# Patient Record
Sex: MALE | Race: WHITE | Employment: OTHER | URBAN - NONMETROPOLITAN AREA
[De-identification: names, ages, dates, MRNs, and addresses within clinical notes are randomized per-mention and may not be internally consistent; named-entity substitution may affect disease eponyms.]

---

## 2017-03-27 RX ORDER — RAMIPRIL 2.5 MG/1
2.5 CAPSULE ORAL DAILY
Qty: 90 CAPSULE | Refills: 2 | Status: SHIPPED | OUTPATIENT
Start: 2017-03-27 | End: 2017-10-17 | Stop reason: SDUPTHER

## 2017-04-01 LAB
BUN BLDV-MCNC: 33 MG/DL
CALCIUM SERPL-MCNC: 9.5 MG/DL
CHLORIDE BLD-SCNC: 100 MMOL/L
CO2: 20 MMOL/L
CREAT SERPL-MCNC: 1.56 MG/DL
GFR CALCULATED: 45
GLUCOSE BLD-MCNC: 204 MG/DL
HCT VFR BLD CALC: 35.3 % (ref 41–53)
HEMOGLOBIN: 11.4 G/DL (ref 13.5–17.5)
PHOSPHORUS: 3.4 MG/DL
POTASSIUM SERPL-SCNC: 5.2 MMOL/L
PTH INTACT: 19
SODIUM BLD-SCNC: 138 MMOL/L
VITAMIN D 25-HYDROXY: 22.7
VITAMIN D2, 25 HYDROXY: NORMAL
VITAMIN D3,25 HYDROXY: NORMAL

## 2017-04-03 RX ORDER — RAMIPRIL 2.5 MG/1
CAPSULE ORAL
Qty: 90 CAPSULE | Refills: 2 | Status: SHIPPED | OUTPATIENT
Start: 2017-04-03 | End: 2017-10-12 | Stop reason: SDUPTHER

## 2017-04-13 ENCOUNTER — OFFICE VISIT (OUTPATIENT)
Dept: NEPHROLOGY | Age: 67
End: 2017-04-13

## 2017-04-13 VITALS
OXYGEN SATURATION: 96 % | DIASTOLIC BLOOD PRESSURE: 74 MMHG | BODY MASS INDEX: 34.72 KG/M2 | SYSTOLIC BLOOD PRESSURE: 120 MMHG | WEIGHT: 256 LBS | HEART RATE: 72 BPM

## 2017-04-13 DIAGNOSIS — D63.8 ANEMIA, CHRONIC DISEASE: ICD-10-CM

## 2017-04-13 DIAGNOSIS — N18.3 TYPE 2 DIABETES MELLITUS WITH STAGE 3 CHRONIC KIDNEY DISEASE, UNSPECIFIED LONG TERM INSULIN USE STATUS: ICD-10-CM

## 2017-04-13 DIAGNOSIS — I10 ESSENTIAL HYPERTENSION: ICD-10-CM

## 2017-04-13 DIAGNOSIS — E11.22 TYPE 2 DIABETES MELLITUS WITH STAGE 3 CHRONIC KIDNEY DISEASE, UNSPECIFIED LONG TERM INSULIN USE STATUS: ICD-10-CM

## 2017-04-13 DIAGNOSIS — N18.30 CKD (CHRONIC KIDNEY DISEASE) STAGE 3, GFR 30-59 ML/MIN (HCC): Primary | ICD-10-CM

## 2017-04-13 PROCEDURE — G8419 CALC BMI OUT NRM PARAM NOF/U: HCPCS | Performed by: INTERNAL MEDICINE

## 2017-04-13 PROCEDURE — 99213 OFFICE O/P EST LOW 20 MIN: CPT | Performed by: INTERNAL MEDICINE

## 2017-04-13 PROCEDURE — 3046F HEMOGLOBIN A1C LEVEL >9.0%: CPT | Performed by: INTERNAL MEDICINE

## 2017-04-13 PROCEDURE — 1036F TOBACCO NON-USER: CPT | Performed by: INTERNAL MEDICINE

## 2017-04-13 PROCEDURE — 3017F COLORECTAL CA SCREEN DOC REV: CPT | Performed by: INTERNAL MEDICINE

## 2017-04-13 PROCEDURE — G8598 ASA/ANTIPLAT THER USED: HCPCS | Performed by: INTERNAL MEDICINE

## 2017-04-13 PROCEDURE — G8427 DOCREV CUR MEDS BY ELIG CLIN: HCPCS | Performed by: INTERNAL MEDICINE

## 2017-04-13 PROCEDURE — 1123F ACP DISCUSS/DSCN MKR DOCD: CPT | Performed by: INTERNAL MEDICINE

## 2017-04-13 PROCEDURE — 4040F PNEUMOC VAC/ADMIN/RCVD: CPT | Performed by: INTERNAL MEDICINE

## 2017-04-13 RX ORDER — METOPROLOL SUCCINATE 25 MG/1
25 TABLET, EXTENDED RELEASE ORAL DAILY
COMMUNITY

## 2017-09-11 ENCOUNTER — TELEPHONE (OUTPATIENT)
Dept: SURGERY | Age: 67
End: 2017-09-11

## 2017-09-20 ENCOUNTER — OFFICE VISIT (OUTPATIENT)
Dept: SURGERY | Age: 67
End: 2017-09-20
Payer: COMMERCIAL

## 2017-09-20 VITALS
HEIGHT: 72 IN | BODY MASS INDEX: 34.23 KG/M2 | OXYGEN SATURATION: 94 % | RESPIRATION RATE: 18 BRPM | WEIGHT: 252.7 LBS | HEART RATE: 74 BPM | TEMPERATURE: 97 F | SYSTOLIC BLOOD PRESSURE: 118 MMHG | DIASTOLIC BLOOD PRESSURE: 62 MMHG

## 2017-09-20 DIAGNOSIS — I10 ESSENTIAL HYPERTENSION: ICD-10-CM

## 2017-09-20 DIAGNOSIS — Z01.818 PREOP EXAMINATION: ICD-10-CM

## 2017-09-20 DIAGNOSIS — Z12.11 COLON CANCER SCREENING: Primary | ICD-10-CM

## 2017-09-20 DIAGNOSIS — K43.2 RECURRENT INCISIONAL HERNIA: ICD-10-CM

## 2017-09-20 DIAGNOSIS — Z01.818 PRE-OP TESTING: ICD-10-CM

## 2017-09-20 DIAGNOSIS — E66.9 OBESITY (BMI 30-39.9): ICD-10-CM

## 2017-09-20 DIAGNOSIS — K43.2 INCISIONAL HERNIA, WITHOUT OBSTRUCTION OR GANGRENE: Primary | ICD-10-CM

## 2017-09-20 PROCEDURE — G8417 CALC BMI ABV UP PARAM F/U: HCPCS | Performed by: SURGERY

## 2017-09-20 PROCEDURE — 1123F ACP DISCUSS/DSCN MKR DOCD: CPT | Performed by: SURGERY

## 2017-09-20 PROCEDURE — 3017F COLORECTAL CA SCREEN DOC REV: CPT | Performed by: SURGERY

## 2017-09-20 PROCEDURE — 99214 OFFICE O/P EST MOD 30 MIN: CPT | Performed by: SURGERY

## 2017-09-20 PROCEDURE — 1036F TOBACCO NON-USER: CPT | Performed by: SURGERY

## 2017-09-20 PROCEDURE — G8427 DOCREV CUR MEDS BY ELIG CLIN: HCPCS | Performed by: SURGERY

## 2017-09-20 PROCEDURE — G8598 ASA/ANTIPLAT THER USED: HCPCS | Performed by: SURGERY

## 2017-09-20 PROCEDURE — 4040F PNEUMOC VAC/ADMIN/RCVD: CPT | Performed by: SURGERY

## 2017-09-20 ASSESSMENT — ENCOUNTER SYMPTOMS
COLOR CHANGE: 0
SHORTNESS OF BREATH: 0
EYE REDNESS: 0
DIARRHEA: 0
EYE PAIN: 0
EYE ITCHING: 0
RHINORRHEA: 0
FACIAL SWELLING: 0
STRIDOR: 0
CONSTIPATION: 0
VOMITING: 0
ABDOMINAL PAIN: 1
RECTAL PAIN: 0
COUGH: 0
BLOOD IN STOOL: 0
CHOKING: 0
SINUS PRESSURE: 0
ANAL BLEEDING: 1
VOICE CHANGE: 0
WHEEZING: 0
EYE DISCHARGE: 0
PHOTOPHOBIA: 0
APNEA: 0
CHEST TIGHTNESS: 0
TROUBLE SWALLOWING: 0
SORE THROAT: 0
BACK PAIN: 0
ABDOMINAL DISTENTION: 0
NAUSEA: 0

## 2017-09-22 ENCOUNTER — TELEPHONE (OUTPATIENT)
Dept: SURGERY | Age: 67
End: 2017-09-22

## 2017-09-22 DIAGNOSIS — K43.2 INCISIONAL HERNIA, WITHOUT OBSTRUCTION OR GANGRENE: Primary | ICD-10-CM

## 2017-09-26 ENCOUNTER — TELEPHONE (OUTPATIENT)
Dept: SURGERY | Age: 67
End: 2017-09-26

## 2017-09-27 LAB
BUN BLDV-MCNC: 35 MG/DL
CALCIUM SERPL-MCNC: 9.7 MG/DL
CHLORIDE BLD-SCNC: 101 MMOL/L
CO2: 18 MMOL/L
CREAT SERPL-MCNC: 1.95 MG/DL
FERRITIN: 787 NG/ML (ref 18–300)
GFR CALCULATED: 34
GLUCOSE BLD-MCNC: 160 MG/DL
HCT VFR BLD CALC: 34.7 % (ref 41–53)
HEMOGLOBIN: 11.6 G/DL (ref 13.5–17.5)
IRON SATURATION: 27
IRON: 102
PHOSPHORUS: 3.3 MG/DL
POTASSIUM SERPL-SCNC: 5.1 MMOL/L
PTH INTACT: 17
SODIUM BLD-SCNC: 139 MMOL/L
VITAMIN D 25-HYDROXY: 18.8
VITAMIN D2, 25 HYDROXY: NORMAL
VITAMIN D3,25 HYDROXY: NORMAL

## 2017-09-28 ENCOUNTER — TELEPHONE (OUTPATIENT)
Dept: SURGERY | Age: 67
End: 2017-09-28

## 2017-09-28 DIAGNOSIS — K43.2 INCISIONAL HERNIA, WITHOUT OBSTRUCTION OR GANGRENE: Primary | ICD-10-CM

## 2017-10-05 ENCOUNTER — HOSPITAL ENCOUNTER (OUTPATIENT)
Age: 67
Setting detail: OUTPATIENT SURGERY
Discharge: HOME | End: 2017-10-05
Attending: SURGERY | Admitting: SURGERY
Payer: COMMERCIAL

## 2017-10-05 VITALS
BODY MASS INDEX: 33.72 KG/M2 | OXYGEN SATURATION: 96 % | HEIGHT: 72 IN | RESPIRATION RATE: 16 BRPM | DIASTOLIC BLOOD PRESSURE: 69 MMHG | WEIGHT: 249 LBS | HEART RATE: 71 BPM | SYSTOLIC BLOOD PRESSURE: 136 MMHG | TEMPERATURE: 97.2 F

## 2017-10-05 PROCEDURE — 3609009500 HC COLONOSCOPY DIAGNOSTIC OR SCREENING: Performed by: SURGERY

## 2017-10-05 PROCEDURE — 99152 MOD SED SAME PHYS/QHP 5/>YRS: CPT | Performed by: SURGERY

## 2017-10-05 PROCEDURE — 2580000003 HC RX 258: Performed by: SURGERY

## 2017-10-05 PROCEDURE — 6360000002 HC RX W HCPCS: Performed by: SURGERY

## 2017-10-05 PROCEDURE — 7100000000 HC PACU RECOVERY - FIRST 15 MIN: Performed by: SURGERY

## 2017-10-05 PROCEDURE — 99153 MOD SED SAME PHYS/QHP EA: CPT | Performed by: SURGERY

## 2017-10-05 PROCEDURE — 3609027000 HC COLONOSCOPY: Performed by: SURGERY

## 2017-10-05 PROCEDURE — 7100000001 HC PACU RECOVERY - ADDTL 15 MIN: Performed by: SURGERY

## 2017-10-05 PROCEDURE — 3609009600 HC COLONOSCOPY STOMA DX INCLUDING COLLJ SPEC SPX: Performed by: SURGERY

## 2017-10-05 RX ORDER — MIDAZOLAM HYDROCHLORIDE 1 MG/ML
INJECTION INTRAMUSCULAR; INTRAVENOUS PRN
Status: DISCONTINUED | OUTPATIENT
Start: 2017-10-05 | End: 2017-10-05 | Stop reason: HOSPADM

## 2017-10-05 RX ORDER — FENTANYL CITRATE 50 UG/ML
INJECTION, SOLUTION INTRAMUSCULAR; INTRAVENOUS PRN
Status: DISCONTINUED | OUTPATIENT
Start: 2017-10-05 | End: 2017-10-05 | Stop reason: HOSPADM

## 2017-10-05 RX ORDER — SODIUM CHLORIDE 450 MG/100ML
INJECTION, SOLUTION INTRAVENOUS CONTINUOUS
Status: DISCONTINUED | OUTPATIENT
Start: 2017-10-05 | End: 2017-10-05 | Stop reason: HOSPADM

## 2017-10-05 RX ADMIN — SODIUM CHLORIDE: 4.5 INJECTION, SOLUTION INTRAVENOUS at 08:07

## 2017-10-05 ASSESSMENT — PAIN SCALES - GENERAL
PAINLEVEL_OUTOF10: 0
PAINLEVEL_OUTOF10: 0

## 2017-10-05 ASSESSMENT — PAIN - FUNCTIONAL ASSESSMENT: PAIN_FUNCTIONAL_ASSESSMENT: 0-10

## 2017-10-05 NOTE — IP AVS SNAPSHOT
Take 150 mg by mouth daily                                         STARLIX 120 MG tablet   Generic drug:  nateglinide   Take 120 mg by mouth 3 times daily (before meals). TRICOR 145 MG tablet   Generic drug:  fenofibrate   Take 145 mg by mouth daily                                         Vitamin D3 2000 units Caps   Patient to take 5000 IU daily                                                 Allergies as of 10/5/2017        Reactions    Allegra [Fexofenadine Hydrochloride]     dizziness    Flagyl [Metronidazole] Nausea And Vomiting      Immunizations as of 10/5/2017     No immunizations on file. Last Vitals          Most Recent Value    Temp  97.2 °F (36.2 °C)    Pulse  71    Resp  16    BP  136/69         After Visit Summary    This summary was created for you. Thank you for entrusting your care to us. The following information includes details about your hospital/visit stay along with steps you should take to help with your recovery once you leave the hospital.  In this packet, you will find information about the topics listed below:    · Instructions about your medications including a list of your home medications  · A summary of your hospital visit  · Follow-up appointments once you have left the hospital  · Your care plan at home      You may receive a survey regarding the care you received during your stay. Your input is valuable to us. We encourage you to complete and return your survey in the envelope provided. We hope you will choose us in the future for your healthcare needs.           Patient Information     Patient Name PEG Norris Led 1950      Care Provided at:     Name Address Phone       [address] [phone]            Your Visit    Here you will find information about your visit, including the reason for Cholesterol Screening 7/19/1958    Urine Check For Kidney Problems 7/19/1966    Tetanus Combination Vaccine (1 - Tdap) 7/19/1967    Colonoscopy 7/19/1998    Zoster Vaccine 7/19/2008    Pneumococcal Vaccines (two) for all adults aged 72 and over (1 of 2 - PCV13) 1/16/2014    Yearly Flu Vaccine (1) 9/1/2017                 Care Plan Once You Return Home    This section includes instructions you will need to follow once you leave the hospital.  Your care team will discuss these with you, so you and those caring for you know how to best care for your health needs at home. This section may also include educational information about certain health topics that may be of help to you. [employer] Signup     Our records indicate that you have an active Woppa account. You can view your After Visit Summary by going to [email or web address] and logging in with your Woppa username and password. If you don't have a Woppa username and password but a parent or guardian has access to your record, the parent or guardian should login with their own Woppa username and password and access your record to view the After Visit Summary. Additional Information  If you have questions, please contact the physician practice where you receive care. Remember, Woppa is NOT to be used for urgent needs. For medical emergencies, dial 911. For questions regarding your Woppa account call [phone]. If you have a clinical question, please call your doctor's office. View your information online  ? Review your current list of  medications, immunization, and allergies. ? Review your future test results online . ?  Review your discharge instructions provided by your caregivers at discharge    Certain functionality such as prescription refills, scheduling appointments or sending messages to your provider are not activated if your provider does not use Definition 6 in his/her office Watch closely for changes in your health, and be sure to contact your doctor if you have any problems. Where can you learn more? Go to [email or web address] and sign in to your Total-trax account. Enter E264 in the NutraMed box to learn more about \"Colonoscopy: What to Expect at Home. \"     If you do not have an account, please click on the \"Sign Up Now\" link. Current as of: August 9, 2016  Content Version: 11.3  © 5134-4969 [employer]. Care instructions adapted under license by [facility] ([facility]). If you have questions about a medical condition or this instruction, always ask your healthcare professional. [address] any warranty or liability for your use of this information.

## 2017-10-05 NOTE — IP AVS SNAPSHOT
Patient Information     Patient Name PEG Jordan 1950         This is your updated medication list to keep with you all times      TAKE these medications     aspirin 325 MG tablet       AVANDIA 4 MG tablet   Generic drug:  rosiglitazone       CVS VITAMIN B12 1000 MCG tablet   Generic drug:  cyanocobalamin   TAKE 1 TABLET BY MOUTH EVERY DAY       ferrous sulfate 325 (65 Fe) MG tablet       fish oil 1000 MG Caps       magnesium 250 MG Tabs tablet   Commonly known as:  MAGNESIUM-OXIDE       metoprolol succinate 25 MG extended release tablet   Commonly known as:  TOPROL XL       PX MENS MULTIVITAMINS PO       * ramipril 2.5 MG capsule   Commonly known as:  ALTACE   Take 1 capsule by mouth daily       * ramipril 2.5 MG capsule   Commonly known as:  ALTACE   TAKE ONE CAPSULE BY MOUTH EVERY DAY       RANITIDINE 75 PO       STARLIX 120 MG tablet   Generic drug:  nateglinide       TRICOR 145 MG tablet   Generic drug:  fenofibrate       Vitamin D3 2000 units Caps   Patient to take 5000 IU daily       * Notice: This list has 2 medication(s) that are the same as other medications prescribed for you. Read the directions carefully, and ask your doctor or other care provider to review them with you.

## 2017-10-05 NOTE — BRIEF OP NOTE
Brief Postoperative Note  ______________________________________________________________    Patient: Audrey Naqvi  YOB: 1950  MRN: [id number]  Date of Procedure: 10/5/2017    Pre-Op Diagnosis: screening colonoscopy    Post-Op Diagnosis: Residual Diverticulosis   Normal Rectal Stump to 20 cm       Procedure(s):  COLONOSCOPY THROUGH OSTOMY & RECTAL STUMP    Anesthesia: IV Sedation    Surgeon(s):  Lanita Snellen, MD    Staff:  Scrub Person First: Ary Helm     Estimated Blood Loss: * No values recorded between 10/5/2017  8:05 AM and 10/5/2017  0:64 AM *    Complications: None    Specimens:   * No specimens in log *    Implants:  * No implants in log *      Drains:   Closed/Suction Drain Right Abdomen Bulb 19 Maltese (Active)       Closed/Suction Drain Left Abdomen 19 Maltese (Active)       Colostomy RLQ (Active)           Findings: see op note    Lanita Snellen, MD  Date: 10/5/2017  Time: 8:50 AM

## 2017-10-06 NOTE — H&P
[address] [place] [address]                      PREOPERATIVE HISTORY AND PHYSICAL    PATIENT NAME: Kandis Hankins                   :             1950  MED REC NO:   [id number]                            ROOM:  ACCOUNT NO:   [de-identified]                            ADMISSION DATE:  10/05/2017  PROVIDER:     Hang Onofre. Roxy Pleitez MD    CHIEF COMPLAINT:  Ventral hernia, history of colostomy formation. HISTORY OF PRESENT ILLNESS:  The patient is a 39-year-old white male,  formerly patient of mine, who in 2011 had a sigmoid colon resection  secondary to diverticular disease. The patient had a delayed leak,  required a re-exploration with diversion performed per Dr. Eri Mckeon. In , he did underwent a ventral hernia repair with a parastomal  hernia repair via an abdominal wall bilateral myocutaneous advancement  flaps. The patient still has the ostomy. He also has noted some  blood in the ostomy bag and at the rectal stump and has recurrent  hernia. Prior to hernia repair by Dr. Eri Mckeon, he is being admitted  at this time for colonoscopy via the colostomy and of the rectal  stump. PAST MEDICAL HISTORY:  Positive for coronary artery disease,  diverticulitis, hyperlipidemia, hypertension, renal insufficiency,  type 2 diabetes. PAST SURGICAL HISTORY:  Includes appendectomy along with the sigmoid  colon resection as mentioned above with repeat exploration with  formation of colostomy, abdominal hernia repair. The patient has had  remote history of knee surgery. The patient has also had  cardiovascular bypass grafts x3. He has also had rotator cuff and  remote tonsillectomy. ALLERGIES:  FLAGYL and ALLEGRA. FAMILY HISTORY:  Noncontributory. SOCIAL HISTORY:  The patient is . He is a nondrinker,  nonsmoker. MEDICATIONS:  Documented in the chart.     PHYSICAL EXAMINATION:  GENERAL:  The patient is a 53-year-old white male, appears his age. HEAD, EARS, EYES, NOSE, and THROAT:  Show no scleral icterus. CARDIOVASCULAR:  S1, S2.  LUNGS:  The respirations are clear. ABDOMEN:  Soft. Bowel sounds are positive. Examining the abdomen  further does reveal a recurrent hernia in the midline. EXTREMITIES:  Within normal limits. ASSESSMENT/PLAN:  Status post colostomy with some blood in the  colostomy and blood from the rectal stump. The patient here for  colonoscopy via the colostomy and also the rectal stump. FLAVIO GEORGE [facility], MD    D: 10/05/2017 15:48:21       T: 10/05/2017 16:02:37     [email or web address]  Job#: [id number]     Doc#: [id number]

## 2017-10-06 NOTE — OP NOTE
were  given for IV sedation and throughout the procedure another 3 mg was  given. The Olympus endoscope was inserted through the ostomy, passed  up through the rest of the descending colon across the transverse  colon and then down into the cecum. Picture of the cecum was taken. We could also see the appendiceal orifice and then the scope was  withdrawn.  _____ to the ostomy and I reinserted the scope all the way  back again to view the area . At this point in time, the scope was  withdrawn again in a retrograde fashion and we reviewed and we will  continue in this manner of withdrawal, reinsertion to view the area. There was no polyps. There was some residual diverticulosis. The  scope was withdrawn back to the ostomy. The patient was then placed  in the left lateral decubitus position and the rectal stump was  cannulated. There was noted to be some mucus, but I was able to pass  the scope up to about 20 cm. I could not see to find any movement  after this. The scope was continued to be withdrawn. The patient  tolerated the procedure well. The anal opening looked normal.        FLAVIO GEORGE [facility], MD    D: 10/05/2017 13:08:34       T: 10/05/2017 13:14:22     [email or web address]  Job#: [id number]     Doc#: [id number]

## 2017-10-09 ENCOUNTER — HOSPITAL ENCOUNTER (OUTPATIENT)
Dept: CT IMAGING | Age: 67
Discharge: HOME | End: 2017-10-09
Payer: COMMERCIAL

## 2017-10-09 DIAGNOSIS — K43.2 INCISIONAL HERNIA, WITHOUT OBSTRUCTION OR GANGRENE: ICD-10-CM

## 2017-10-09 LAB — POC CREATININE WHOLE BLOOD: 1.7 MG/DL (ref 0.5–1.2)

## 2017-10-09 PROCEDURE — 74176 CT ABD & PELVIS W/O CONTRAST: CPT

## 2017-10-09 PROCEDURE — 82565 ASSAY OF CREATININE: CPT

## 2017-10-12 ENCOUNTER — OFFICE VISIT (OUTPATIENT)
Dept: NEPHROLOGY | Age: 67
End: 2017-10-12
Payer: COMMERCIAL

## 2017-10-12 VITALS
WEIGHT: 248 LBS | SYSTOLIC BLOOD PRESSURE: 110 MMHG | HEART RATE: 80 BPM | RESPIRATION RATE: 16 BRPM | DIASTOLIC BLOOD PRESSURE: 70 MMHG | BODY MASS INDEX: 33.63 KG/M2

## 2017-10-12 DIAGNOSIS — D63.8 ANEMIA, CHRONIC DISEASE: ICD-10-CM

## 2017-10-12 DIAGNOSIS — N18.30 CKD (CHRONIC KIDNEY DISEASE) STAGE 3, GFR 30-59 ML/MIN (HCC): Primary | ICD-10-CM

## 2017-10-12 DIAGNOSIS — E55.9 VITAMIN D DEFICIENCY: ICD-10-CM

## 2017-10-12 DIAGNOSIS — N18.3 TYPE 2 DIABETES MELLITUS WITH STAGE 3 CHRONIC KIDNEY DISEASE, UNSPECIFIED LONG TERM INSULIN USE STATUS: ICD-10-CM

## 2017-10-12 DIAGNOSIS — E11.22 TYPE 2 DIABETES MELLITUS WITH STAGE 3 CHRONIC KIDNEY DISEASE, UNSPECIFIED LONG TERM INSULIN USE STATUS: ICD-10-CM

## 2017-10-12 DIAGNOSIS — I10 ESSENTIAL HYPERTENSION: ICD-10-CM

## 2017-10-12 PROCEDURE — G8427 DOCREV CUR MEDS BY ELIG CLIN: HCPCS | Performed by: INTERNAL MEDICINE

## 2017-10-12 PROCEDURE — 1123F ACP DISCUSS/DSCN MKR DOCD: CPT | Performed by: INTERNAL MEDICINE

## 2017-10-12 PROCEDURE — 99214 OFFICE O/P EST MOD 30 MIN: CPT | Performed by: INTERNAL MEDICINE

## 2017-10-12 PROCEDURE — 3046F HEMOGLOBIN A1C LEVEL >9.0%: CPT | Performed by: INTERNAL MEDICINE

## 2017-10-12 PROCEDURE — G8484 FLU IMMUNIZE NO ADMIN: HCPCS | Performed by: INTERNAL MEDICINE

## 2017-10-12 PROCEDURE — 4040F PNEUMOC VAC/ADMIN/RCVD: CPT | Performed by: INTERNAL MEDICINE

## 2017-10-12 PROCEDURE — 1036F TOBACCO NON-USER: CPT | Performed by: INTERNAL MEDICINE

## 2017-10-12 PROCEDURE — 3017F COLORECTAL CA SCREEN DOC REV: CPT | Performed by: INTERNAL MEDICINE

## 2017-10-12 PROCEDURE — G8417 CALC BMI ABV UP PARAM F/U: HCPCS | Performed by: INTERNAL MEDICINE

## 2017-10-12 PROCEDURE — G8598 ASA/ANTIPLAT THER USED: HCPCS | Performed by: INTERNAL MEDICINE

## 2017-10-12 RX ORDER — ISOSORBIDE MONONITRATE 30 MG/1
30 TABLET, EXTENDED RELEASE ORAL DAILY
COMMUNITY
End: 2020-07-16

## 2017-10-12 NOTE — PROGRESS NOTES
ventral hernia by Dr. Chai Rockwell on 16 October 2017. Vitamin D level was low at 22. We asked him to take  vitamin D3 over-the-counter 2000 international units 1 capsule a day. Vitamin D level actually dropped to 18 now. ROS:Constitutional: negative  Eyes: negative  Ears, nose, mouth, throat, and face: negative  Respiratory: negative  Cardiovascular: negative  Gastrointestinal: negative  Genitourinary:negative  Integument/breast: negative  Hematologic/lymphatic: negative  Musculoskeletal:positive for arthralgias and stiff joints  Neurological: negative  Behavioral/Psych: negative  Endocrine: negative  Allergic/Immunologic: negative  Medications:     Current Outpatient Prescriptions   Medication Sig Dispense Refill    isosorbide mononitrate (IMDUR) 30 MG extended release tablet Take 30 mg by mouth daily      metoprolol succinate (TOPROL XL) 25 MG extended release tablet Take 25 mg by mouth daily      ramipril (ALTACE) 2.5 MG capsule Take 1 capsule by mouth daily 90 capsule 2    ferrous sulfate 325 (65 FE) MG tablet Take 325 mg by mouth daily (with breakfast)      Cholecalciferol (VITAMIN D3) 2000 UNITS CAPS Patient to take 5000 IU daily 30 capsule 0    CVS VITAMIN B12 1000 MCG tablet TAKE 1 TABLET BY MOUTH EVERY DAY 90 tablet 3    magnesium (MAGNESIUM-OXIDE) 250 MG TABS tablet Take 250 mg by mouth 2 times daily       rosiglitazone (AVANDIA) 4 MG tablet Take 4 mg by mouth 2 times daily.  Multiple Vitamins-Minerals (PX MENS MULTIVITAMINS PO) Take  by mouth. otc-with iron       fenofibrate (TRICOR) 145 MG tablet Take 145 mg by mouth daily       aspirin 325 MG tablet Take 325 mg by mouth daily. Stop 1 week preop      Ranitidine HCl (RANITIDINE 75 PO) Take 150 mg by mouth daily       Omega-3 Fatty Acids (FISH OIL) 1000 MG CAPS Take 1,000 mg by mouth 2 times daily.  nateglinide (STARLIX) 120 MG tablet Take 120 mg by mouth 3 times daily (before meals).          No current facility-administered

## 2017-10-12 NOTE — PATIENT INSTRUCTIONS
Please increase  vitamin D3 over-the-counter from 2000 international units 1 capsule a day to twice a day.

## 2017-10-17 RX ORDER — RAMIPRIL 2.5 MG/1
2.5 CAPSULE ORAL DAILY
Qty: 90 CAPSULE | Refills: 3 | Status: SHIPPED | OUTPATIENT
Start: 2017-10-17 | End: 2018-12-03 | Stop reason: SDUPTHER

## 2017-10-25 NOTE — H&P
Subjective:      Patient ID: Lisa Lincoln is a 69 y.o. male. Chief Complaint   Patient presents with    Surgical Consult       est pt-parastomal hernia-rectal bleeding-      HPI  Diann Baker is a 49-year-old male who presents for evaluation of a abdominal wall bulge. Back in September 2011 he underwent open sigmoid resection by Dr. Lorelei Neumann secondary to diverticular disease. Patient had a delayed leak. Required exploration with diversion. In October 2012 he underwent ventral hernia repair and parastomal hernia repair with bilateral abdominal wall myocutaneous advancement flaps using biological mesh. He states he has done well until the last few months. He states he has developed a bulge through the previous midline incision above his umbilicus but below the xiphoid. Bulge is starting to increase in size. Becoming more difficult to reduce. Mildly tender. Worse with increased activity, lifting and bending over. Improved with rest and in the morning. Worsens throughout the day when he is very active. He states recently he noticed some blood on his stools in the ostomy bag. Also noticed some blood at the rectal stump. He states over the last week he has not noticed anything else. Denies having any colonoscopy or endoscopy since original surgery. Denies any generalized abdominal pain. No radiation of the discomfort from the hernia. No sharp severe pains. No chest pain or shortness of breath. No lightheadedness or dizziness. No unexplained weight loss. No fever, chills or sweats. No new urinary complaints. He states overall he is doing well with the ostomy. He has a known large bladder diverticulum and states he is not wanting the ostomy reversed at this time.     Review of Systems   Constitutional: Negative for activity change, appetite change, chills, diaphoresis, fatigue, fever and unexpected weight change.    HENT: Negative for congestion, dental problem, drooling, ear discharge, ear pain, facial swelling, hearing loss, mouth sores, nosebleeds, postnasal drip, rhinorrhea, sinus pressure, sneezing, sore throat, tinnitus, trouble swallowing and voice change. Eyes: Negative for photophobia, pain, discharge, redness, itching and visual disturbance. Respiratory: Negative for apnea, cough, choking, chest tightness, shortness of breath, wheezing and stridor. Cardiovascular: Negative for chest pain, palpitations and leg swelling. Gastrointestinal: Positive for abdominal pain and anal bleeding. Negative for abdominal distention, blood in stool, constipation, diarrhea, nausea, rectal pain and vomiting. Patient has colostomy   Endocrine: Negative for cold intolerance, heat intolerance, polydipsia, polyphagia and polyuria. Genitourinary: Negative for decreased urine volume, difficulty urinating, discharge, dysuria, enuresis, flank pain, frequency, genital sores, hematuria, penile pain, penile swelling, scrotal swelling, testicular pain and urgency. Musculoskeletal: Negative for arthralgias, back pain, gait problem, joint swelling, myalgias, neck pain and neck stiffness. Skin: Negative for color change, pallor, rash and wound. Allergic/Immunologic: Negative for environmental allergies, food allergies and immunocompromised state. Neurological: Negative for dizziness, tremors, seizures, syncope, facial asymmetry, speech difficulty, weakness, light-headedness, numbness and headaches. Hematological: Negative for adenopathy. Does not bruise/bleed easily. Psychiatric/Behavioral: Negative for agitation, behavioral problems, confusion, decreased concentration, dysphoric mood, hallucinations, self-injury, sleep disturbance and suicidal ideas.  The patient is not nervous/anxious and is not hyperactive.       Past Medical History        Past Medical History:   Diagnosis Date    Blood transfusion      CAD (coronary artery disease)       sees Dr Anastasia Mane Diverticulitis      Conjunctivae and EOM are normal. Pupils are equal, round, and reactive to light. Right eye exhibits no discharge. Left eye exhibits no discharge. No scleral icterus. Neck: Trachea normal, normal range of motion, full passive range of motion without pain and phonation normal. Neck supple. No JVD present. Cardiovascular: Normal rate, regular rhythm, S1 normal, S2 normal and normal heart sounds. Exam reveals no gallop and no friction rub. No murmur heard. Pulmonary/Chest: Effort normal and breath sounds normal. No respiratory distress. He has no decreased breath sounds. He has no wheezes. He has no rhonchi. He has no rales. He exhibits no tenderness and no deformity. Abdominal: Soft. Bowel sounds are normal. He exhibits no distension, no abdominal bruit and no mass. There is no hepatosplenomegaly. There is tenderness (mild) in the epigastric area. There is no rigidity, no rebound and no guarding. A hernia is present. Hernia confirmed positive in the ventral area (recurrent incisional). Musculoskeletal: Normal range of motion. He exhibits no edema or tenderness. Neurological: He is alert and oriented to person, place, and time. He has normal strength. No cranial nerve deficit or sensory deficit. Skin: Skin is warm and dry. No rash noted. He is not diaphoretic. No erythema. No pallor. Psychiatric: He has a normal mood and affect.  His speech is normal and behavior is normal. Judgment and thought content normal. Cognition and memory are normal.   Vitals reviewed.           Lab Results   Component Value Date      04/01/2017     K 5.2 04/01/2017      04/01/2017     CO2 20 04/01/2017            Lab Results   Component Value Date     CREATININE 1.56 04/01/2017            Lab Results   Component Value Date     WBC 8.0 10/31/2012     HGB 11.4 (A) 04/01/2017     HCT 35.3 (A) 04/01/2017     MCV 90.7 10/31/2012      10/31/2012      No results found for: ALT, AST, GGT, ALKPHOS, BILITOT  No results found for: LIPASE      Imaging - none     Patient Active Problem List   Diagnosis    Diverticula of colon    Wound, open, abdominal wall, anterior    Incisional hernia    CKD (chronic kidney disease) stage 3, GFR 30-59 ml/min    Vitamin D deficiency    HTN (hypertension)    DM type 2 (diabetes mellitus, type 2) ([facility] [address]    CAD (coronary artery disease)    Iron deficiency anemia    Hypertriglyceridemia    Hyperkalemia    Type 2 diabetes mellitus with stage 3 chronic kidney disease, without long-term current use of insulin (HCC)    Anemia, chronic disease      Assessment:      1. Recurrent incisional hernia  2. Rectal stump bleed  3. Blood on stool/ostomy  4. Obesity (BMI 34)  5. Possible small parastomal hernia                          Plan:      1. Schedule Rico JOHNSON for Repair recurrent incisional hernia with mesh. 2. He will undergo pre-operative clearance per anesthesia guidelines with risk factors listed under the past medical history diagnosis & problem list.  3. The risks, benefits and alternatives were discussed with Shahab JOHNSON including non-operative management. The pros and cons of robotic, laparoscopic and open techniques were discussed. The pros and cons of mesh insertion were discussed. All questions answered. He understands and wishes to proceed with surgical intervention. 4. Restrictions discussed with Nicol Moya and he expresses understanding. 5. He is advised to call back directly if there are further questions/concerns, or if his symptoms worsen prior to surgery. 6.  Obtain colonoscopy and anoscopy prior to surgical intervention. Set up with Dr. Kacy Frye known to patient. 7.  Obtain CT abdomen and pelvis prior to surgical intervention to better delineate anatomy secondary to high probability of hostile abdomen.

## 2017-10-26 ENCOUNTER — HOSPITAL ENCOUNTER (OUTPATIENT)
Age: 67
Setting detail: OUTPATIENT SURGERY
Discharge: HOME | End: 2017-10-26
Attending: SURGERY | Admitting: SURGERY
Payer: COMMERCIAL

## 2017-10-26 ENCOUNTER — ANESTHESIA EVENT (OUTPATIENT)
Dept: OPERATING ROOM | Age: 67
End: 2017-10-26
Payer: COMMERCIAL

## 2017-10-26 ENCOUNTER — ANESTHESIA (OUTPATIENT)
Dept: OPERATING ROOM | Age: 67
End: 2017-10-26
Payer: COMMERCIAL

## 2017-10-26 VITALS — DIASTOLIC BLOOD PRESSURE: 74 MMHG | SYSTOLIC BLOOD PRESSURE: 167 MMHG | OXYGEN SATURATION: 100 %

## 2017-10-26 VITALS
TEMPERATURE: 97.2 F | SYSTOLIC BLOOD PRESSURE: 135 MMHG | HEART RATE: 69 BPM | WEIGHT: 248 LBS | RESPIRATION RATE: 16 BRPM | HEIGHT: 72 IN | OXYGEN SATURATION: 94 % | BODY MASS INDEX: 33.59 KG/M2 | DIASTOLIC BLOOD PRESSURE: 67 MMHG

## 2017-10-26 LAB — GLUCOSE BLD-MCNC: 184 MG/DL (ref 70–108)

## 2017-10-26 PROCEDURE — 3700000001 HC ADD 15 MINUTES (ANESTHESIA): Performed by: SURGERY

## 2017-10-26 PROCEDURE — C1781 MESH (IMPLANTABLE): HCPCS | Performed by: SURGERY

## 2017-10-26 PROCEDURE — 82948 REAGENT STRIP/BLOOD GLUCOSE: CPT

## 2017-10-26 PROCEDURE — 3600000003 HC SURGERY LEVEL 3 BASE: Performed by: SURGERY

## 2017-10-26 PROCEDURE — 7100000010 HC PHASE II RECOVERY - FIRST 15 MIN: Performed by: SURGERY

## 2017-10-26 PROCEDURE — 2500000003 HC RX 250 WO HCPCS: Performed by: SURGERY

## 2017-10-26 PROCEDURE — 6370000000 HC RX 637 (ALT 250 FOR IP)

## 2017-10-26 PROCEDURE — 49560 PR REPAIR INCISIONAL HERNIA,REDUCIBLE: CPT | Performed by: SURGERY

## 2017-10-26 PROCEDURE — 2500000003 HC RX 250 WO HCPCS: Performed by: NURSE ANESTHETIST, CERTIFIED REGISTERED

## 2017-10-26 PROCEDURE — 3700000000 HC ANESTHESIA ATTENDED CARE: Performed by: SURGERY

## 2017-10-26 PROCEDURE — 7100000001 HC PACU RECOVERY - ADDTL 15 MIN: Performed by: SURGERY

## 2017-10-26 PROCEDURE — A4409 OST SKN BARR CONVEX <=4 SQ I: HCPCS | Performed by: SURGERY

## 2017-10-26 PROCEDURE — 7100000000 HC PACU RECOVERY - FIRST 15 MIN: Performed by: SURGERY

## 2017-10-26 PROCEDURE — A5063 DRAIN OSTOMY POUCH W/FLANGE: HCPCS | Performed by: SURGERY

## 2017-10-26 PROCEDURE — 2580000003 HC RX 258

## 2017-10-26 PROCEDURE — 7100000011 HC PHASE II RECOVERY - ADDTL 15 MIN: Performed by: SURGERY

## 2017-10-26 PROCEDURE — 6360000002 HC RX W HCPCS: Performed by: NURSE ANESTHETIST, CERTIFIED REGISTERED

## 2017-10-26 PROCEDURE — 3600000013 HC SURGERY LEVEL 3 ADDTL 15MIN: Performed by: SURGERY

## 2017-10-26 PROCEDURE — 6360000002 HC RX W HCPCS

## 2017-10-26 DEVICE — PATCH HERN M W4.3XL5.5IN UNCOATED MFIL PROPYLENE OVL SELF: Type: IMPLANTABLE DEVICE | Site: ABDOMEN | Status: FUNCTIONAL

## 2017-10-26 RX ORDER — PROPOFOL 10 MG/ML
INJECTION, EMULSION INTRAVENOUS PRN
Status: DISCONTINUED | OUTPATIENT
Start: 2017-10-26 | End: 2017-10-26 | Stop reason: SDUPTHER

## 2017-10-26 RX ORDER — ACETAMINOPHEN 325 MG/1
650 TABLET ORAL EVERY 4 HOURS PRN
Status: DISCONTINUED | OUTPATIENT
Start: 2017-10-26 | End: 2017-10-26 | Stop reason: HOSPADM

## 2017-10-26 RX ORDER — SODIUM CHLORIDE 0.9 % (FLUSH) 0.9 %
10 SYRINGE (ML) INJECTION EVERY 12 HOURS SCHEDULED
Status: DISCONTINUED | OUTPATIENT
Start: 2017-10-26 | End: 2017-10-26 | Stop reason: HOSPADM

## 2017-10-26 RX ORDER — OXYCODONE HYDROCHLORIDE AND ACETAMINOPHEN 5; 325 MG/1; MG/1
TABLET ORAL
Status: COMPLETED
Start: 2017-10-26 | End: 2017-10-26

## 2017-10-26 RX ORDER — METOPROLOL SUCCINATE 25 MG/1
25 TABLET, EXTENDED RELEASE ORAL ONCE
Status: DISCONTINUED | OUTPATIENT
Start: 2017-10-26 | End: 2017-10-26 | Stop reason: HOSPADM

## 2017-10-26 RX ORDER — NEOSTIGMINE METHYLSULFATE 1 MG/ML
INJECTION, SOLUTION INTRAVENOUS PRN
Status: DISCONTINUED | OUTPATIENT
Start: 2017-10-26 | End: 2017-10-26 | Stop reason: SDUPTHER

## 2017-10-26 RX ORDER — MIDAZOLAM HYDROCHLORIDE 1 MG/ML
INJECTION INTRAMUSCULAR; INTRAVENOUS PRN
Status: DISCONTINUED | OUTPATIENT
Start: 2017-10-26 | End: 2017-10-26 | Stop reason: SDUPTHER

## 2017-10-26 RX ORDER — OXYCODONE HYDROCHLORIDE AND ACETAMINOPHEN 5; 325 MG/1; MG/1
2 TABLET ORAL EVERY 4 HOURS PRN
Status: DISCONTINUED | OUTPATIENT
Start: 2017-10-26 | End: 2017-10-26 | Stop reason: HOSPADM

## 2017-10-26 RX ORDER — SODIUM CHLORIDE 9 MG/ML
INJECTION, SOLUTION INTRAVENOUS CONTINUOUS
Status: DISCONTINUED | OUTPATIENT
Start: 2017-10-26 | End: 2017-10-26 | Stop reason: HOSPADM

## 2017-10-26 RX ORDER — OXYCODONE HYDROCHLORIDE AND ACETAMINOPHEN 5; 325 MG/1; MG/1
1 TABLET ORAL EVERY 4 HOURS PRN
Status: DISCONTINUED | OUTPATIENT
Start: 2017-10-26 | End: 2017-10-26 | Stop reason: HOSPADM

## 2017-10-26 RX ORDER — ONDANSETRON 2 MG/ML
INJECTION INTRAMUSCULAR; INTRAVENOUS PRN
Status: DISCONTINUED | OUTPATIENT
Start: 2017-10-26 | End: 2017-10-26 | Stop reason: SDUPTHER

## 2017-10-26 RX ORDER — LIDOCAINE HYDROCHLORIDE 20 MG/ML
INJECTION, SOLUTION EPIDURAL; INFILTRATION; INTRACAUDAL; PERINEURAL PRN
Status: DISCONTINUED | OUTPATIENT
Start: 2017-10-26 | End: 2017-10-26 | Stop reason: SDUPTHER

## 2017-10-26 RX ORDER — MORPHINE SULFATE 2 MG/ML
4 INJECTION, SOLUTION INTRAMUSCULAR; INTRAVENOUS
Status: DISCONTINUED | OUTPATIENT
Start: 2017-10-26 | End: 2017-10-26 | Stop reason: HOSPADM

## 2017-10-26 RX ORDER — OXYCODONE HYDROCHLORIDE AND ACETAMINOPHEN 5; 325 MG/1; MG/1
1 TABLET ORAL EVERY 4 HOURS PRN
Qty: 30 TABLET | Refills: 0 | Status: SHIPPED | OUTPATIENT
Start: 2017-10-26 | End: 2017-11-08 | Stop reason: ALTCHOICE

## 2017-10-26 RX ORDER — MORPHINE SULFATE 2 MG/ML
2 INJECTION, SOLUTION INTRAMUSCULAR; INTRAVENOUS
Status: DISCONTINUED | OUTPATIENT
Start: 2017-10-26 | End: 2017-10-26 | Stop reason: HOSPADM

## 2017-10-26 RX ORDER — BUPIVACAINE HYDROCHLORIDE AND EPINEPHRINE 5; 5 MG/ML; UG/ML
INJECTION, SOLUTION EPIDURAL; INTRACAUDAL; PERINEURAL PRN
Status: DISCONTINUED | OUTPATIENT
Start: 2017-10-26 | End: 2017-10-26 | Stop reason: HOSPADM

## 2017-10-26 RX ORDER — GLYCOPYRROLATE 0.2 MG/ML
INJECTION INTRAMUSCULAR; INTRAVENOUS PRN
Status: DISCONTINUED | OUTPATIENT
Start: 2017-10-26 | End: 2017-10-26 | Stop reason: SDUPTHER

## 2017-10-26 RX ORDER — SUCCINYLCHOLINE CHLORIDE 20 MG/ML
INJECTION INTRAMUSCULAR; INTRAVENOUS PRN
Status: DISCONTINUED | OUTPATIENT
Start: 2017-10-26 | End: 2017-10-26 | Stop reason: SDUPTHER

## 2017-10-26 RX ORDER — DEXAMETHASONE SODIUM PHOSPHATE 4 MG/ML
INJECTION, SOLUTION INTRA-ARTICULAR; INTRALESIONAL; INTRAMUSCULAR; INTRAVENOUS; SOFT TISSUE PRN
Status: DISCONTINUED | OUTPATIENT
Start: 2017-10-26 | End: 2017-10-26 | Stop reason: SDUPTHER

## 2017-10-26 RX ORDER — FENTANYL CITRATE 50 UG/ML
INJECTION, SOLUTION INTRAMUSCULAR; INTRAVENOUS PRN
Status: DISCONTINUED | OUTPATIENT
Start: 2017-10-26 | End: 2017-10-26 | Stop reason: SDUPTHER

## 2017-10-26 RX ORDER — SODIUM CHLORIDE 0.9 % (FLUSH) 0.9 %
10 SYRINGE (ML) INJECTION PRN
Status: DISCONTINUED | OUTPATIENT
Start: 2017-10-26 | End: 2017-10-26 | Stop reason: HOSPADM

## 2017-10-26 RX ORDER — ONDANSETRON 2 MG/ML
4 INJECTION INTRAMUSCULAR; INTRAVENOUS EVERY 6 HOURS PRN
Status: DISCONTINUED | OUTPATIENT
Start: 2017-10-26 | End: 2017-10-26 | Stop reason: HOSPADM

## 2017-10-26 RX ORDER — ROCURONIUM BROMIDE 10 MG/ML
INJECTION, SOLUTION INTRAVENOUS PRN
Status: DISCONTINUED | OUTPATIENT
Start: 2017-10-26 | End: 2017-10-26 | Stop reason: SDUPTHER

## 2017-10-26 RX ORDER — KETOROLAC TROMETHAMINE 10 MG/1
10 TABLET, FILM COATED ORAL EVERY 8 HOURS PRN
Qty: 15 TABLET | Refills: 0 | Status: SHIPPED | OUTPATIENT
Start: 2017-10-26 | End: 2017-11-08 | Stop reason: ALTCHOICE

## 2017-10-26 RX ADMIN — DEXAMETHASONE SODIUM PHOSPHATE 8 MG: 4 INJECTION, SOLUTION INTRAMUSCULAR; INTRAVENOUS at 14:31

## 2017-10-26 RX ADMIN — Medication 30 MG: at 14:25

## 2017-10-26 RX ADMIN — SODIUM CHLORIDE: 9 INJECTION, SOLUTION INTRAVENOUS at 14:53

## 2017-10-26 RX ADMIN — GLYCOPYRROLATE 0.6 MG: 0.2 INJECTION, SOLUTION INTRAMUSCULAR; INTRAVENOUS at 15:21

## 2017-10-26 RX ADMIN — ONDANSETRON 4 MG: 2 INJECTION INTRAMUSCULAR; INTRAVENOUS at 14:31

## 2017-10-26 RX ADMIN — PHENYLEPHRINE HYDROCHLORIDE 100 MCG: 10 INJECTION INTRAMUSCULAR; INTRAVENOUS; SUBCUTANEOUS at 14:54

## 2017-10-26 RX ADMIN — SUCCINYLCHOLINE CHLORIDE 140 MG: 20 INJECTION, SOLUTION INTRAMUSCULAR; INTRAVENOUS at 14:14

## 2017-10-26 RX ADMIN — LIDOCAINE HYDROCHLORIDE 100 MG: 20 INJECTION, SOLUTION EPIDURAL; INFILTRATION; INTRACAUDAL; PERINEURAL at 14:14

## 2017-10-26 RX ADMIN — OXYCODONE HYDROCHLORIDE AND ACETAMINOPHEN 1 TABLET: 5; 325 TABLET ORAL at 16:54

## 2017-10-26 RX ADMIN — SODIUM CHLORIDE: 9 INJECTION, SOLUTION INTRAVENOUS at 12:43

## 2017-10-26 RX ADMIN — CEFAZOLIN SODIUM 1 G: 1 INJECTION, SOLUTION INTRAVENOUS at 14:20

## 2017-10-26 RX ADMIN — FENTANYL CITRATE 100 MCG: 50 INJECTION INTRAMUSCULAR; INTRAVENOUS at 14:14

## 2017-10-26 RX ADMIN — MIDAZOLAM HYDROCHLORIDE 2 MG: 1 INJECTION, SOLUTION INTRAMUSCULAR; INTRAVENOUS at 14:11

## 2017-10-26 RX ADMIN — FENTANYL CITRATE 50 MCG: 50 INJECTION INTRAMUSCULAR; INTRAVENOUS at 15:31

## 2017-10-26 RX ADMIN — NEOSTIGMINE METHYLSULFATE 4 MG: 1 INJECTION, SOLUTION INTRAVENOUS at 15:21

## 2017-10-26 RX ADMIN — PHENYLEPHRINE HYDROCHLORIDE 100 MCG: 10 INJECTION INTRAMUSCULAR; INTRAVENOUS; SUBCUTANEOUS at 14:27

## 2017-10-26 RX ADMIN — PROPOFOL 150 MG: 10 INJECTION, EMULSION INTRAVENOUS at 14:14

## 2017-10-26 ASSESSMENT — PULMONARY FUNCTION TESTS
PIF_VALUE: 18
PIF_VALUE: 19
PIF_VALUE: 18
PIF_VALUE: 6
PIF_VALUE: 19
PIF_VALUE: 18
PIF_VALUE: 18
PIF_VALUE: 4
PIF_VALUE: 19
PIF_VALUE: 19
PIF_VALUE: 18
PIF_VALUE: 18
PIF_VALUE: 19
PIF_VALUE: 18
PIF_VALUE: 3
PIF_VALUE: 19
PIF_VALUE: 18
PIF_VALUE: 19
PIF_VALUE: 3
PIF_VALUE: 18
PIF_VALUE: 17
PIF_VALUE: 19
PIF_VALUE: 18
PIF_VALUE: 20
PIF_VALUE: 4
PIF_VALUE: 19
PIF_VALUE: 19
PIF_VALUE: 29
PIF_VALUE: 17
PIF_VALUE: 18
PIF_VALUE: 18
PIF_VALUE: 19
PIF_VALUE: 20
PIF_VALUE: 18
PIF_VALUE: 18
PIF_VALUE: 0
PIF_VALUE: 19
PIF_VALUE: 19
PIF_VALUE: 18
PIF_VALUE: 17
PIF_VALUE: 4
PIF_VALUE: 17
PIF_VALUE: 18
PIF_VALUE: 18
PIF_VALUE: 1
PIF_VALUE: 18
PIF_VALUE: 0
PIF_VALUE: 19
PIF_VALUE: 19
PIF_VALUE: 16
PIF_VALUE: 19
PIF_VALUE: 17
PIF_VALUE: 17
PIF_VALUE: 18
PIF_VALUE: 19
PIF_VALUE: 18
PIF_VALUE: 19
PIF_VALUE: 18
PIF_VALUE: 18
PIF_VALUE: 19
PIF_VALUE: 24
PIF_VALUE: 18
PIF_VALUE: 0
PIF_VALUE: 0
PIF_VALUE: 28
PIF_VALUE: 19
PIF_VALUE: 18
PIF_VALUE: 18
PIF_VALUE: 6
PIF_VALUE: 2
PIF_VALUE: 18
PIF_VALUE: 18
PIF_VALUE: 19

## 2017-10-26 ASSESSMENT — PAIN DESCRIPTION - ORIENTATION: ORIENTATION: MID

## 2017-10-26 ASSESSMENT — PAIN SCALES - GENERAL
PAINLEVEL_OUTOF10: 4
PAINLEVEL_OUTOF10: 5

## 2017-10-26 ASSESSMENT — PAIN DESCRIPTION - LOCATION: LOCATION: ABDOMEN

## 2017-10-26 ASSESSMENT — PAIN DESCRIPTION - PAIN TYPE: TYPE: SURGICAL PAIN

## 2017-10-26 NOTE — PROGRESS NOTES
1529- Pt arrived to PACU, pt hooked up to monitor, VSS, pt breathing deeply on 02, Monica BILLINGSLEY at bedside for report, pt has incision to mid abdomen dressing dry clean and intact. 1533- Ice pack to abdomen. 1537- Pt states not to sore just sleepy. 1546- Pt continues to sleep comfortably. 1559- Pt meets discharge criteria.    Pt to \[facility] [place] [facility]\"" report to RN

## 2017-10-26 NOTE — ANESTHESIA PRE PROCEDURE
Department of Anesthesiology  Preprocedure Note       Name:  Ronny Wisdom   Age:  69 y.o.  :  1950                                          MRN:  [id number]         Date:  10/26/2017      Surgeon: James Cr):  Kathryn Ennis MD    Procedure: Procedure(s):  RECURRENT INCISIONAL HERNIA REPAIR, POSSIBLE MESH    Medications prior to admission:   Prior to Admission medications    Medication Sig Start Date End Date Taking? Authorizing Provider   aspirin 81 MG tablet Take 81 mg by mouth daily   Yes Historical Provider, MD   ramipril (ALTACE) 2.5 MG capsule Take 1 capsule by mouth daily 10/17/17  Yes Zofia Reeder MD   isosorbide mononitrate (IMDUR) 30 MG extended release tablet Take 30 mg by mouth daily   Yes Historical Provider, MD   metoprolol succinate (TOPROL XL) 25 MG extended release tablet Take 25 mg by mouth daily   Yes Historical Provider, MD   ferrous sulfate 325 (65 FE) MG tablet Take 325 mg by mouth daily (with breakfast)   Yes Historical Provider, MD   Cholecalciferol (VITAMIN D3) 2000 UNITS CAPS Patient to take 5000 IU daily  Patient taking differently: 2 times daily Patient to take 5000 IU daily 10/13/16  Yes Zofia Reeder MD   [employer]S VITAMIN B12 1000 MCG tablet TAKE 1 TABLET BY MOUTH EVERY DAY 11/4/15  Yes Zofia Reeder MD   magnesium (MAGNESIUM-OXIDE) 250 MG TABS tablet Take 500 mg by mouth daily    Yes Historical Provider, MD   rosiglitazone (AVANDIA) 4 MG tablet Take 4 mg by mouth 2 times daily. Yes Historical Provider, MD   Multiple Vitamins-Minerals (PX MENS MULTIVITAMINS PO) Take  by mouth. otc-with iron    Yes Historical Provider, MD   fenofibrate (TRICOR) 145 MG tablet Take 145 mg by mouth daily    Yes Historical Provider, MD   Ranitidine HCl (RANITIDINE 75 PO) Take 150 mg by mouth daily    Yes Historical Provider, MD   Omega-3 Fatty Acids (FISH OIL) 1000 MG CAPS Take 1,000 mg by mouth 2 times daily.      Yes Historical Provider, MD   nateglinide (STARLIX) 120 MG tablet Take 120 at [facility] Dr. Marcela Lin TEST  2017    COLON SURGERY  2011 x2    Sigmoid colon resection and closure of bladder diverticulm--Dr. Diana Alarcon, Dr. Vishal Sheikh COLONOSCOPY  2007    COLONOSCOPY  10/5/2017    COLONOSCOPY DIAGNOSTIC/STOMA performed by Brittany Villasenor MD at [facility]   [facility]  2005    X 3 vessel    HERNIA REPAIR  10-30-12    xenograft mesh-Dr. Margaret Candelario    KNEE SURGERY      calcium deposit removed age 13    OTHER SURGICAL HISTORY  10-30-12 (Dr. Margaret Candelario)    bilateral abd wall component separation with para stomal hernia repair    [place] COLONOSCOPY FLX DX W/COLLJ SPEC WHEN PFRMD Left 10/5/2017    COLONOSCOPY DIAGNOSTIC OR SCREENING performed by Brittany Villasenor MD at [address] [place] [address]     Left    TONSILLECTOMY      age 22       Social History:    Social History   Substance Use Topics    Smoking status: Never Smoker    Smokeless tobacco: Never Used    Alcohol use No                                Counseling given: Not Answered      Vital Signs (Current):   Vitals:    10/19/17 1055 10/26/17 1216 10/26/17 1300   BP:  (!) 140/68    Pulse:  67    Resp:  16    Temp:  98.3 °F (36.8 °C)    TempSrc:  Temporal    SpO2:  96%    Weight: 245 lb (111.1 kg)  248 lb (112.5 kg)   Height: 6' (1.829 m) 6' (1.829 m)                                               BP Readings from Last 3 Encounters:   10/26/17 (!) 140/68   10/12/17 110/70   10/05/17 136/69       NPO Status: Time of last liquid consumption: 2300                        Time of last solid consumption: 2300                        Date of last liquid consumption: 10/25/17                        Date of last solid food consumption: 10/25/17    BMI:   Wt Readings from Last 3 Encounters:   10/26/17 248 lb (112.5 kg)   10/12/17 248 lb (112.5 kg)   10/05/17 249 lb (112.9 kg)     Body mass index is 33.63 kg/m².     CBC:   Lab Results   Component Value Date    WBC 8.0 10/31/2012

## 2017-10-27 ENCOUNTER — TELEPHONE (OUTPATIENT)
Dept: SURGERY | Age: 67
End: 2017-10-27

## 2017-10-27 NOTE — OP NOTE
proceed with the procedure. DESCRIPTION OF THE PROCEDURE:  After informed consent was signed and  placed on the chart, the patient was taken back to the operating room  and placed supine on the operating room table. General anesthesia was  induced. He tolerated this well throughout the case. All pressure  points were padded. He was on preoperative antibiotics. Bilateral  lower extremity sequential compression devices were placed prior to  incision. His abdomen and pelvis were prepped and draped in the usual  sterile standard fashion. A timeout occurred prior to the operation,  which not only identified the patient, but also the planned procedure  to be performed. At the end of the timeout, there were no questions  or concerns. I began the operation first by making a vertical  incision directly over the area of concern that was marked  preoperatively with the patient's same day surgery. This was deepened  to the deep dermal and subcutaneous tissues. The hernia sac was  easily identified. This was sharply dissected through. The hernia  sac itself was excised in its entirety all the way down to the base  and then discarded. The undersurface of the fascia more superior  overall looked good. There were some adhesions that were taken down,  but surprisingly, there were very few adhesions overall. Further  down, however, just above the umbilicus on the incision, there was  another hernia and then just off to the left side, there was another  small hernia defect. These three were all put together as one defect. The rest of the abdomen otherwise looked good. No other defects were  identified. In regards to the stoma, there was a small parastomal  hernia, but nothing that was large by any means. The rest of the  fascia around this area was nice, strong, and healthy. I did elect to  place 2 separate interrupted #1 Novafil sutures through and through  the fascia around the stoma to tighten this up. Otherwise, things  looked pretty good from that standpoint. Anterior aspect of the  midline fascia was then freed off. Appeared to reapproximate in the  midline without any undue tension and prior to doing this, I elected  to place an 11 x 14 cm piece of Ventrio up as an underlay. This was  secured with multiple interrupted 0 Novafil sutures at the edge of the  mesh going around about every couple centimeters. Sutures were placed  through the fascia/muscle, then through and through the mesh, and back  up to the fascia/muscle and tied. Care was taken to avoid any bowel  injury during this time. After the mesh was placed up as an underlay,  the fascia itself was then reapproximated in the midline with multiple  interrupted #1 Novafil in figure-of-eight. Irrigation. Hemostasis  was adequate. Deep dermal tissues were then reapproximated with  interrupted 3-0 Vicryl suture. The skin was then reapproximated with  skin staples. Closed incision was then cleaned, dried, and dry  sterile dressing applied. Sponge, needle, and instrumentation count  was correct at the end of the procedure. The patient tolerated the  procedure well with no apparent complications and less than 50 mL of  blood loss. He was able to be brought out of general anesthesia and  then transferred to the postanesthesia care unit in stable condition.         Mary Lizama M.D.    D: 10/26/2017 15:36:29       T: 10/26/2017 18:58:02     [email or web address]  Job#: [id number]     Doc#: [id number]

## 2017-10-27 NOTE — ANESTHESIA POSTPROCEDURE EVALUATION
Department of Anesthesiology  Postprocedure Note    Patient: Rachell Iniguez  MRN: [id number]  YOB: 1950  Date of evaluation: 10/26/2017  Time:  10:24 PM     Procedure Summary     Date:  10/26/17 Room / Location:  [address] ELIZABETH Polanco    Anesthesia Start:  1409 Anesthesia Stop:  5264    Procedure:  RECURRENT INCISIONAL HERNIA REPAIR WITH MESH (N/A Abdomen) Diagnosis:  (RECURRENT INCISIONAL HERNIA)    Surgeon:  Tess Dodson MD Responsible Provider:  Joe Leyva MD    Anesthesia Type:  general ASA Status:  3          Anesthesia Type: No value filed. Maye Phase I: Maye Score: 9    Maye Phase II: Maye Score: 9    Last vitals: Reviewed and per EMR flowsheets.        Anesthesia Post Evaluation    Patient location during evaluation: PACU  Patient participation: complete - patient participated  Level of consciousness: awake and alert  Airway patency: patent  Nausea & Vomiting: no nausea  Complications: no  Cardiovascular status: blood pressure returned to baseline and hemodynamically stable  Respiratory status: acceptable and spontaneous ventilation  Hydration status: euvolemic

## 2017-11-06 DIAGNOSIS — Z12.11 COLON CANCER SCREENING: ICD-10-CM

## 2017-11-06 DIAGNOSIS — Z01.818 PREOP EXAMINATION: ICD-10-CM

## 2017-11-06 DIAGNOSIS — K43.2 RECURRENT INCISIONAL HERNIA: ICD-10-CM

## 2017-11-08 ENCOUNTER — OFFICE VISIT (OUTPATIENT)
Dept: SURGERY | Age: 67
End: 2017-11-08

## 2017-11-08 VITALS
OXYGEN SATURATION: 96 % | DIASTOLIC BLOOD PRESSURE: 62 MMHG | HEART RATE: 73 BPM | WEIGHT: 249 LBS | RESPIRATION RATE: 20 BRPM | HEIGHT: 72 IN | SYSTOLIC BLOOD PRESSURE: 138 MMHG | BODY MASS INDEX: 33.72 KG/M2 | TEMPERATURE: 97.9 F

## 2017-11-08 DIAGNOSIS — Z87.19 S/P HERNIA REPAIR: ICD-10-CM

## 2017-11-08 DIAGNOSIS — Z87.19 S/P REPAIR OF VENTRAL HERNIA: Primary | ICD-10-CM

## 2017-11-08 DIAGNOSIS — Z98.890 S/P HERNIA REPAIR: ICD-10-CM

## 2017-11-08 DIAGNOSIS — Z98.890 S/P REPAIR OF VENTRAL HERNIA: Primary | ICD-10-CM

## 2017-11-08 PROCEDURE — 99024 POSTOP FOLLOW-UP VISIT: CPT | Performed by: NURSE PRACTITIONER

## 2017-11-08 ASSESSMENT — ENCOUNTER SYMPTOMS
APNEA: 0
EYE REDNESS: 0
COUGH: 0
EYE PAIN: 0
SHORTNESS OF BREATH: 0
TROUBLE SWALLOWING: 0
SORE THROAT: 0
BACK PAIN: 0
CHOKING: 0
CONSTIPATION: 0
CHEST TIGHTNESS: 0
BLOOD IN STOOL: 0
COLOR CHANGE: 0
NAUSEA: 0
VOMITING: 0
ABDOMINAL PAIN: 1
PHOTOPHOBIA: 0
RHINORRHEA: 0
SINUS PAIN: 0
EYE ITCHING: 0
EYE DISCHARGE: 0
SINUS PRESSURE: 0
WHEEZING: 0
RECTAL PAIN: 0
VOICE CHANGE: 0
FACIAL SWELLING: 0
ANAL BLEEDING: 0
STRIDOR: 0
ABDOMINAL DISTENTION: 0
DIARRHEA: 0

## 2017-11-08 NOTE — PROGRESS NOTES
[facility]  [facility]  [address]  [address] [address]  Dept: [phone]  Dept Fax: [phone]  Loc: [phone]    Visit Date: 11/8/2017    Ebenezer Paz is a 69 y.o. male who presents today for:  Chief Complaint   Patient presents with    Post-Op Check     S/p Repair of recurrent incisional hernia x3 with mesh. Primary repair, small parastomal hernia. 10/26/17       HPI:     HPI    Ivette Degroot is a 68-year-old male patient who presents today for follow-up status post repair of recurrent incisional hernia x3 in primary repair a small peristomal hernia 2 weeks ago. Presents with wife. He is doing really well. States he has some abdominal discomfort with movement, but otherwise denies any abdominal pain. He is off pain medication. Ostomy is functioning well without difficulties. No rectal bleeding or bleeding from ostomy. Appetite back to normal.  No nausea or vomiting. Midline incision intact with staples. No signs of infection. Denies any shortness of breath or difficulty breathing. Denies any issues urinating. No signs of bleeding from incision. Wears abdominal binder at all times.     Past Medical History:   Diagnosis Date    Blood transfusion     CAD (coronary artery disease)     sees Dr Kelvin Ellis Diverticulitis     Hyperlipidemia     Hypertension     Kidney disease     insufficiency after surgery-sees Dr Rob Weems S/P colostomy [facility])     Type II or unspecified type diabetes mellitus without mention of complication, not stated as uncontrolled       Past Surgical History:   Procedure Laterality Date    APPENDECTOMY  2011    CARDIAC CATHETERIZATION  Sept 2012    at [facility] Dr. Wong TEST  2017   [address] [place] [address]  2011 x2    Sigmoid colon resection and closure of bladder diverticulm--Dr. Phani Torres, Dr. Brandon Tyler COLONOSCOPY  2007    COLONOSCOPY  10/5/2017    COLONOSCOPY DIAGNOSTIC/STOMA performed by Max Randhawa Efraín Melendez MD at [address]  2005    X 3 vessel    HERNIA REPAIR  10-30-12    xenograft mesh-Dr. Clemetine Gottron    KNEE SURGERY      calcium deposit removed age 13    OTHER SURGICAL HISTORY  10-30-12 (Dr. Clemetine Gottron)    bilateral abd wall component separation with para stomal hernia repair    [place] COLONOSCOPY FLX DX W/COLLJ SPEC WHEN PFRMD Left 10/5/2017    COLONOSCOPY DIAGNOSTIC OR SCREENING [facility] N/A 10/26/2017    RECURRENT INCISIONAL HERNIA REPAIR WITH MESH performed by Vladimir Delgadillo MD at [address] [facility]    Left    TONSILLECTOMY      age 22       Family History   Problem Relation Age of Onset    Cancer Mother     Diabetes Mother     High Blood Pressure Mother     High Cholesterol Mother     Stroke Mother     Heart Disease Father     Heart Disease Brother     Asthma Brother        Social History   Substance Use Topics    Smoking status: Never Smoker    Smokeless tobacco: Never Used    Alcohol use No      Current Outpatient Prescriptions   Medication Sig Dispense Refill    ramipril (ALTACE) 2.5 MG capsule Take 1 capsule by mouth daily 90 capsule 3    isosorbide mononitrate (IMDUR) 30 MG extended release tablet Take 30 mg by mouth daily      metoprolol succinate (TOPROL XL) 25 MG extended release tablet Take 25 mg by mouth daily      ferrous sulfate 325 (65 FE) MG tablet Take 325 mg by mouth daily (with breakfast)      Cholecalciferol (VITAMIN D3) 2000 UNITS CAPS Patient to take 5000 IU daily (Patient taking differently: 2 times daily Patient to take 5000 IU daily) 30 capsule 0    CVS VITAMIN B12 1000 MCG tablet TAKE 1 TABLET BY MOUTH EVERY DAY 90 tablet 3    magnesium (MAGNESIUM-OXIDE) 250 MG TABS tablet Take 500 mg by mouth daily       rosiglitazone (AVANDIA) 4 MG tablet Take 4 mg by mouth 2 times daily.         Multiple Vitamins-Minerals (PX decreased breath sounds. He has no wheezes. He has no rhonchi. He has no rales. He exhibits no tenderness and no deformity. Abdominal: Soft. Bowel sounds are normal. He exhibits no distension, no abdominal bruit and no mass. There is no hepatosplenomegaly. There is no tenderness. There is no rigidity, no rebound and no guarding. No hernia. Hernia confirmed negative in the ventral area. Musculoskeletal: Normal range of motion. He exhibits no edema or tenderness. Neurological: He is alert and oriented to person, place, and time. He has normal strength. No cranial nerve deficit or sensory deficit. Skin: Skin is warm and dry. No rash noted. He is not diaphoretic. No erythema. No pallor. Psychiatric: He has a normal mood and affect. His speech is normal and behavior is normal. Judgment and thought content normal. Cognition and memory are normal.   Vitals reviewed. Patient Active Problem List   Diagnosis    Diverticula of colon    Wound, open, abdominal wall, anterior    Incisional hernia    CKD (chronic kidney disease) stage 3, GFR 30-59 ml/min    Vitamin D deficiency    HTN (hypertension)    DM type 2 (diabetes mellitus, type 2) ([facility]    CAD (coronary artery disease)    Iron deficiency anemia    Hypertriglyceridemia    Hyperkalemia    Type 2 diabetes mellitus with stage 3 chronic kidney disease, without long-term current use of insulin (HCC)    Anemia, chronic disease    Recurrent incisional hernia     Assessment:      1. Post op check status post repair of recurrent incisional hernia x3 in primary repair a small peristomal hernia   2. Colostomy    Plan:     1. Abdomen benign. Appetite returned. No nausea. 2. Incision healing well. No redness, swelling, or drainage. Continue wound care as directed. All staples removed. 3. Weaned off narcotics. Continue tylenol and ibuprofen as needed. 4. Continue lifting restrictions for the full 6-8 weeks after surgery.  Continue to increase

## 2018-04-08 LAB
BUN BLDV-MCNC: 31 MG/DL
CALCIUM SERPL-MCNC: 9.3 MG/DL
CHLORIDE BLD-SCNC: 98 MMOL/L
CO2: 21 MMOL/L
CREAT SERPL-MCNC: 1.66 MG/DL
GFR CALCULATED: 41
GLUCOSE BLD-MCNC: 250 MG/DL
PHOSPHORUS: 3.2 MG/DL
POTASSIUM SERPL-SCNC: 4.8 MMOL/L
PTH INTACT: 12
SODIUM BLD-SCNC: 136 MMOL/L
VITAMIN D 25-HYDROXY: 31.8
VITAMIN D2, 25 HYDROXY: NORMAL
VITAMIN D3,25 HYDROXY: NORMAL

## 2018-04-12 ENCOUNTER — OFFICE VISIT (OUTPATIENT)
Dept: NEPHROLOGY | Age: 68
End: 2018-04-12
Payer: COMMERCIAL

## 2018-04-12 VITALS — WEIGHT: 241.2 LBS | BODY MASS INDEX: 32.71 KG/M2 | SYSTOLIC BLOOD PRESSURE: 130 MMHG | DIASTOLIC BLOOD PRESSURE: 72 MMHG

## 2018-04-12 DIAGNOSIS — I10 ESSENTIAL HYPERTENSION: ICD-10-CM

## 2018-04-12 DIAGNOSIS — N18.3 TYPE 2 DIABETES MELLITUS WITH STAGE 3 CHRONIC KIDNEY DISEASE, UNSPECIFIED LONG TERM INSULIN USE STATUS: ICD-10-CM

## 2018-04-12 DIAGNOSIS — E11.22 TYPE 2 DIABETES MELLITUS WITH STAGE 3 CHRONIC KIDNEY DISEASE, UNSPECIFIED LONG TERM INSULIN USE STATUS: ICD-10-CM

## 2018-04-12 DIAGNOSIS — N18.30 CKD (CHRONIC KIDNEY DISEASE) STAGE 3, GFR 30-59 ML/MIN (HCC): Primary | ICD-10-CM

## 2018-04-12 DIAGNOSIS — D50.8 OTHER IRON DEFICIENCY ANEMIA: ICD-10-CM

## 2018-04-12 DIAGNOSIS — D63.8 ANEMIA, CHRONIC DISEASE: ICD-10-CM

## 2018-04-12 PROCEDURE — G8598 ASA/ANTIPLAT THER USED: HCPCS | Performed by: INTERNAL MEDICINE

## 2018-04-12 PROCEDURE — 1036F TOBACCO NON-USER: CPT | Performed by: INTERNAL MEDICINE

## 2018-04-12 PROCEDURE — G8417 CALC BMI ABV UP PARAM F/U: HCPCS | Performed by: INTERNAL MEDICINE

## 2018-04-12 PROCEDURE — 3046F HEMOGLOBIN A1C LEVEL >9.0%: CPT | Performed by: INTERNAL MEDICINE

## 2018-04-12 PROCEDURE — 3017F COLORECTAL CA SCREEN DOC REV: CPT | Performed by: INTERNAL MEDICINE

## 2018-04-12 PROCEDURE — 4040F PNEUMOC VAC/ADMIN/RCVD: CPT | Performed by: INTERNAL MEDICINE

## 2018-04-12 PROCEDURE — 99214 OFFICE O/P EST MOD 30 MIN: CPT | Performed by: INTERNAL MEDICINE

## 2018-04-12 PROCEDURE — 2022F DILAT RTA XM EVC RTNOPTHY: CPT | Performed by: INTERNAL MEDICINE

## 2018-04-12 PROCEDURE — 1123F ACP DISCUSS/DSCN MKR DOCD: CPT | Performed by: INTERNAL MEDICINE

## 2018-04-12 PROCEDURE — G8427 DOCREV CUR MEDS BY ELIG CLIN: HCPCS | Performed by: INTERNAL MEDICINE

## 2018-07-24 LAB
ALBUMIN SERPL-MCNC: 4.2 G/DL
ALP BLD-CCNC: 42 U/L
ALT SERPL-CCNC: 20 U/L
ANION GAP SERPL CALCULATED.3IONS-SCNC: 1.4 MMOL/L
AST SERPL-CCNC: 25 U/L
BILIRUB SERPL-MCNC: 0.5 MG/DL (ref 0.1–1.4)
BUN BLDV-MCNC: 44 MG/DL
CALCIUM SERPL-MCNC: 9.7 MG/DL
CHLORIDE BLD-SCNC: 103 MMOL/L
CHOLESTEROL, TOTAL: 270 MG/DL
CHOLESTEROL/HDL RATIO: ABNORMAL
CO2: 20 MMOL/L
CREAT SERPL-MCNC: 2.26 MG/DL
GFR CALCULATED: 29
GLUCOSE BLD-MCNC: 150 MG/DL
HDLC SERPL-MCNC: 9 MG/DL (ref 35–70)
LDL CHOLESTEROL CALCULATED: 187 MG/DL (ref 0–160)
MAGNESIUM: NORMAL MG/DL
POTASSIUM SERPL-SCNC: 5.4 MMOL/L
SODIUM BLD-SCNC: 137 MMOL/L
TOTAL PROTEIN: 7.2
TRIGL SERPL-MCNC: 370 MG/DL
VLDLC SERPL CALC-MCNC: 74 MG/DL

## 2018-10-10 ENCOUNTER — HOSPITAL ENCOUNTER (OUTPATIENT)
Age: 68
Setting detail: OUTPATIENT SURGERY
Discharge: HOME | End: 2018-10-10
Attending: SPECIALIST | Admitting: SPECIALIST
Payer: COMMERCIAL

## 2018-10-10 VITALS
TEMPERATURE: 97.2 F | HEIGHT: 72 IN | RESPIRATION RATE: 8 BRPM | SYSTOLIC BLOOD PRESSURE: 113 MMHG | DIASTOLIC BLOOD PRESSURE: 63 MMHG | OXYGEN SATURATION: 93 % | HEART RATE: 70 BPM | WEIGHT: 247 LBS | BODY MASS INDEX: 33.46 KG/M2

## 2018-10-10 PROCEDURE — 2500000003 HC RX 250 WO HCPCS: Performed by: SPECIALIST

## 2018-10-10 PROCEDURE — 2709999900 HC NON-CHARGEABLE SUPPLY: Performed by: SPECIALIST

## 2018-10-10 PROCEDURE — 7100000011 HC PHASE II RECOVERY - ADDTL 15 MIN: Performed by: SPECIALIST

## 2018-10-10 PROCEDURE — 3600000012 HC SURGERY LEVEL 2 ADDTL 15MIN: Performed by: SPECIALIST

## 2018-10-10 PROCEDURE — 3600000002 HC SURGERY LEVEL 2 BASE: Performed by: SPECIALIST

## 2018-10-10 PROCEDURE — 88305 TISSUE EXAM BY PATHOLOGIST: CPT

## 2018-10-10 PROCEDURE — 7100000010 HC PHASE II RECOVERY - FIRST 15 MIN: Performed by: SPECIALIST

## 2018-10-10 RX ORDER — LIDOCAINE HYDROCHLORIDE AND EPINEPHRINE 10; 10 MG/ML; UG/ML
INJECTION, SOLUTION INFILTRATION; PERINEURAL PRN
Status: DISCONTINUED | OUTPATIENT
Start: 2018-10-10 | End: 2018-10-10 | Stop reason: HOSPADM

## 2018-10-10 RX ORDER — PIOGLITAZONEHYDROCHLORIDE 45 MG/1
45 TABLET ORAL DAILY
COMMUNITY

## 2018-10-10 RX ORDER — CEFADROXIL 500 MG/1
500 CAPSULE ORAL 2 TIMES DAILY
COMMUNITY
End: 2019-04-18

## 2018-10-10 ASSESSMENT — PAIN - FUNCTIONAL ASSESSMENT: PAIN_FUNCTIONAL_ASSESSMENT: 0-10

## 2018-10-10 NOTE — H&P
[address] [address]  History and Physical Update    Pt Name: Alma Monterroso  MRN: [id number]  YOB: 1950  Date of evaluation: 10/10/2018    I have examined the patient and reviewed the H&P/Consult and there are no changes to the patient or plans.       Mandeep Salaazr  Electronically signed 10/10/2018 at 7:07 AM

## 2018-12-04 RX ORDER — RAMIPRIL 2.5 MG/1
CAPSULE ORAL
Qty: 90 CAPSULE | Refills: 1 | Status: SHIPPED | OUTPATIENT
Start: 2018-12-04 | End: 2019-04-18 | Stop reason: SDUPTHER

## 2019-01-22 LAB
ALBUMIN SERPL-MCNC: 3.6 G/DL
ALP BLD-CCNC: 42 U/L
ALT SERPL-CCNC: 16 U/L
ANION GAP SERPL CALCULATED.3IONS-SCNC: 1.3 MMOL/L
AST SERPL-CCNC: 28 U/L
BILIRUB SERPL-MCNC: 0.3 MG/DL (ref 0.1–1.4)
BUN BLDV-MCNC: 31 MG/DL
CALCIUM SERPL-MCNC: 9 MG/DL
CHLORIDE BLD-SCNC: 109 MMOL/L
CHOLESTEROL, TOTAL: 114 MG/DL
CHOLESTEROL/HDL RATIO: 10
CO2: 20 MMOL/L
CREAT SERPL-MCNC: 1.74 MG/DL
GFR CALCULATED: 39
GLUCOSE BLD-MCNC: 132 MG/DL
HDLC SERPL-MCNC: 10 MG/DL (ref 35–70)
LDL CHOLESTEROL CALCULATED: 69 MG/DL (ref 0–160)
MAGNESIUM: 1.6 MG/DL
POTASSIUM SERPL-SCNC: 5.4 MMOL/L
SODIUM BLD-SCNC: 142 MMOL/L
TOTAL PROTEIN: 6.4
TRIGL SERPL-MCNC: 177 MG/DL
VLDLC SERPL CALC-MCNC: 35 MG/DL

## 2019-04-11 LAB
ANION GAP SERPL CALCULATED.3IONS-SCNC: 10 MMOL/L (ref 4–12)
BUN BLDV-MCNC: 34 MG/DL (ref 7–20)
CALCIUM SERPL-MCNC: 8.9 MG/DL (ref 8.8–10.5)
CHLORIDE BLD-SCNC: 106 MEQ/L (ref 101–111)
CO2: 22 MEQ/L (ref 21–32)
CREAT SERPL-MCNC: 1.69 MG/DL (ref 0.7–1.3)
CREATININE CLEARANCE: 40
GLUCOSE, FASTING: 185 MG/DL (ref 70–110)
PARATHYROID HORMONE INTACT: 16.7 U/ML (ref 12–88)
PHOSPHORUS: 3.4 MG/DL (ref 2.4–4.7)
POTASSIUM SERPL-SCNC: 5.1 MEQ/L (ref 3.6–5)
SODIUM BLD-SCNC: 138 MEQ/L (ref 135–145)
VITAMIN D 25-HYDROXY: 46.66 NG/ML (ref 30–100)

## 2019-04-18 ENCOUNTER — OFFICE VISIT (OUTPATIENT)
Dept: NEPHROLOGY | Age: 69
End: 2019-04-18
Payer: MEDICARE

## 2019-04-18 VITALS
HEART RATE: 78 BPM | WEIGHT: 256.6 LBS | DIASTOLIC BLOOD PRESSURE: 64 MMHG | SYSTOLIC BLOOD PRESSURE: 119 MMHG | BODY MASS INDEX: 34.8 KG/M2 | OXYGEN SATURATION: 95 %

## 2019-04-18 DIAGNOSIS — E08.22 DIABETES MELLITUS DUE TO UNDERLYING CONDITION WITH STAGE 3 CHRONIC KIDNEY DISEASE, UNSPECIFIED WHETHER LONG TERM INSULIN USE: ICD-10-CM

## 2019-04-18 DIAGNOSIS — N18.30 CKD (CHRONIC KIDNEY DISEASE) STAGE 3, GFR 30-59 ML/MIN (HCC): Primary | ICD-10-CM

## 2019-04-18 DIAGNOSIS — E55.9 VITAMIN D DEFICIENCY: ICD-10-CM

## 2019-04-18 DIAGNOSIS — I10 ESSENTIAL HYPERTENSION: ICD-10-CM

## 2019-04-18 DIAGNOSIS — D63.8 ANEMIA, CHRONIC DISEASE: ICD-10-CM

## 2019-04-18 DIAGNOSIS — N18.3 DIABETES MELLITUS DUE TO UNDERLYING CONDITION WITH STAGE 3 CHRONIC KIDNEY DISEASE, UNSPECIFIED WHETHER LONG TERM INSULIN USE: ICD-10-CM

## 2019-04-18 DIAGNOSIS — E87.5 HYPERKALEMIA: ICD-10-CM

## 2019-04-18 PROCEDURE — 2022F DILAT RTA XM EVC RTNOPTHY: CPT | Performed by: INTERNAL MEDICINE

## 2019-04-18 PROCEDURE — 4040F PNEUMOC VAC/ADMIN/RCVD: CPT | Performed by: INTERNAL MEDICINE

## 2019-04-18 PROCEDURE — G8598 ASA/ANTIPLAT THER USED: HCPCS | Performed by: INTERNAL MEDICINE

## 2019-04-18 PROCEDURE — 1123F ACP DISCUSS/DSCN MKR DOCD: CPT | Performed by: INTERNAL MEDICINE

## 2019-04-18 PROCEDURE — 1036F TOBACCO NON-USER: CPT | Performed by: INTERNAL MEDICINE

## 2019-04-18 PROCEDURE — G8427 DOCREV CUR MEDS BY ELIG CLIN: HCPCS | Performed by: INTERNAL MEDICINE

## 2019-04-18 PROCEDURE — G8417 CALC BMI ABV UP PARAM F/U: HCPCS | Performed by: INTERNAL MEDICINE

## 2019-04-18 PROCEDURE — 99213 OFFICE O/P EST LOW 20 MIN: CPT | Performed by: INTERNAL MEDICINE

## 2019-04-18 PROCEDURE — 3017F COLORECTAL CA SCREEN DOC REV: CPT | Performed by: INTERNAL MEDICINE

## 2019-04-18 RX ORDER — CALCIUM CARBONATE 260MG(650)
TABLET,CHEWABLE ORAL
COMMUNITY
End: 2020-07-16

## 2019-04-18 RX ORDER — RAMIPRIL 2.5 MG/1
2.5 CAPSULE ORAL DAILY
Qty: 90 CAPSULE | Refills: 3 | Status: SHIPPED | OUTPATIENT
Start: 2019-04-18 | End: 2020-04-23

## 2019-04-18 RX ORDER — ROSUVASTATIN CALCIUM 20 MG/1
20 TABLET, COATED ORAL DAILY
COMMUNITY
Start: 2018-07-25

## 2019-04-18 NOTE — PROGRESS NOTES
Renal Progress Note    Assessment and Plan:      Diagnosis Orders   1. CKD (chronic kidney disease) stage 3, GFR 30-59 ml/min ([facility])  Basic Metabolic Panel   2. Essential hypertension     3. Anemia, chronic disease     4. Vitamin D deficiency  Vitamin D 25 Hydroxy    PTH, Intact   5. Hyperkalemia     6. Diabetes mellitus due to underlying condition with stage 3 chronic kidney disease, unspecified whether long term insulin use ([facility])     PLAN:  Labs reviewed and result discussed with the patient   Serum creatinine is stable at 1.69 mg/dl   Vitamin D level is 46  PTH is 16  Serum potassium is borderline high  Low potassium diet   Reviewed medications   Decrease vitamin D 3 to 1000 units daily  Return visit in 12 months        Patient Active Problem List   Diagnosis    Diverticula of colon    Wound, open, abdominal wall, anterior    Incisional hernia    CKD (chronic kidney disease) stage 3, GFR 30-59 ml/min ([facility])    Vitamin D deficiency    HTN (hypertension)    DM [facility], [facility]    CAD (coronary artery disease)    Iron deficiency anemia    Hypertriglyceridemia    Hyperkalemia    Type 2 diabetes mellitus with stage 3 chronic kidney disease, without long-term current use of insulin ([facility])    Anemia, chronic disease    Recurrent incisional hernia       Subjective:   Chief complaint:  Chief Complaint   Patient presents with    Chronic Kidney Disease     stage III      HPI:This is a follow up visit for Mr Blancas Links here today for a follow up visit . Sees him for chronic kidney disease and was last seen about 12 months ago with serum creatinine of 1.66 mg/dl and now 1.69 mg/dl. 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    ROS:Constitutional: negative  Eyes: negative  Ears, nose, mouth, throat, and face: negative  Respiratory: negative  Cardiovascular: negative  Gastrointestinal: negative  Genitourinary:negative  Integument/breast: negative  Hematologic/lymphatic: negative  Musculoskeletal:positive for arthralgias and stiff joints  Neurological: negative  Behavioral/Psych: negative  Endocrine: negative  Allergic/Immunologic: negative  Medications:     Current Outpatient Medications   Medication Sig Dispense Refill    Cholecalciferol (VITAMIN D3) 5000 units TABS Take 500 Units by mouth daily      Misc Natural Products (GLUCOSAMINE CHOND COMPLEX/MSM PO) Take 1 tablet by mouth 2 times daily      rosuvastatin (CRESTOR) 20 MG tablet Take 20 mg by mouth daily       Saw Palmetto, Serenoa repens, (SAW PALMETTO EXTRACT PO) Take 1 tablet by mouth daily       Zinc 10 MG LOZG Take by mouth      Zinc Sulfate (ZINC 15 PO) Take 20 mg by mouth daily      SELENIUM PO Take 30 mg by mouth daily      LYCOPENE PO Take 6.5 mg by mouth daily      TURMERIC CURCUMIN PO Take 53 mg by mouth daily      ramipril (ALTACE) 2.5 MG capsule Take 1 capsule by mouth daily 90 capsule 3    pioglitazone (ACTOS) 45 MG tablet Take 45 mg by mouth daily      isosorbide mononitrate (IMDUR) 30 MG extended release tablet Take 30 mg by mouth daily      metoprolol succinate (TOPROL XL) 25 MG extended release tablet Take 25 mg by mouth daily      ferrous sulfate 325 (65 FE) MG tablet Take 325 mg by mouth daily (with breakfast)      CVS VITAMIN B12 1000 MCG tablet TAKE 1 TABLET BY MOUTH EVERY DAY 90 tablet 3    Magnesium 500 MG TABS Take 500 mg by mouth daily       Multiple Vitamins-Minerals (PX MENS MULTIVITAMINS PO) Take  by mouth. otc-with iron       fenofibrate (TRICOR) 145 MG tablet Take 145 mg by mouth daily       aspirin 325 MG tablet Take 325 mg by mouth daily. Stop 1 week preop      Ranitidine HCl (RANITIDINE 75 PO) Take 150 mg by mouth daily       Omega-3 Fatty Acids (FISH OIL) 1000 MG CAPS Take 1,000 mg by mouth 2 times daily.         nateglinide (STARLIX) 120 MG tablet Take 120 mg by mouth 2 times daily        No current facility-administered medications for edema  Musculoskeletal:Intact  Neuro:Alert and oriented with no deficit    Electronically signed by Bill Johnson MD on 4/18/2019 at 1:18 PM

## 2019-04-18 NOTE — PATIENT INSTRUCTIONS
Artichokes, cooked one medium 343   Mashed potatoes ½ C 343   Edamame/soybeans, green ½ C 338   Tomato, canned  ½ C 325   Raisins ¼ C 299   Tomato, raw one medium 292   Papaya one small 286   Peach one medium 285   Pistachios, dry roasted, salted  1 oz (47 nuts) 285   Pumpkin, cooked, mashed ½ C 282   French fries 10 fries 278   Mushrooms, white, cooked ½ C 278   Beets, cooked ½ C 259   [place] sprouts, cooked ½ C 247   Kiwi one medium 237   Orange, raw one medium 237   Green peas, cooked ½ C 217   Cantaloupe, raw ½ C 214   Pear, raw one medium 212   Almonds, dry roasted 1 oz (24 nuts) 202   Apricot, canned, juice pack ½ C 202   Asparagus, cooked ½ C 202   [place] squash, cooked ½ C 201   Apple, raw with skin one medium 195   Honeydew ½ C 194   Carrots, cooked ½ C 183   Onions, cooked ½ C 175   Spinach, raw 1 C 167   Corn, sweet yellow ½ C 163   Red hernandez pepper ½ C 157   Kale, cooked ½ C 150   Cabbage, cooked ½ C 147   Mustard greens, cooked ½ C 142   [place] ½ C 139   Figs, dried two figs 134   Summer squash, cooked ½ C 126   Grapes 10 grapes 120   Okra, cooked ½ C 108   Bamboo shoots, canned 1 C 108   Celery, raw one stalk 105   Peanut butter, creamy 1 Tbsp 104   Green beans, cooked ½ C 104   Cauliflower, cooked ½ C 91   Mushrooms, shitake, cooked ½ C 88   Watermelon ½ C 85   Cucumber, peeled ½ C 88   Iceberg lettuce 1 C 81   Tomato, sun dried one piece 80   Eggplant, cooked ½ C 69   Pickle one pickle 61   Ketchup 1 Tbsp 60   Radishes one radish 57     5   C=cup, mg=milligrams, oz=ounces, Tbsp=tablespoon    Reference  [place] Dept of Agriculture, [address], [employer]  [employer] Database for Standard Reference, Release 25: Potassium, K (mg) content of selected foods per common measure

## 2019-09-06 ENCOUNTER — TELEPHONE (OUTPATIENT)
Dept: NEPHROLOGY | Age: 69
End: 2019-09-06

## 2019-09-06 DIAGNOSIS — E87.5 HYPERKALEMIA: Primary | ICD-10-CM

## 2019-09-23 LAB — POTASSIUM SERPL-SCNC: 5.3 MEQ/L (ref 3.6–5)

## 2019-09-24 ENCOUNTER — TELEPHONE (OUTPATIENT)
Dept: NEPHROLOGY | Age: 69
End: 2019-09-24

## 2019-09-24 DIAGNOSIS — E87.5 HYPERKALEMIA: Primary | ICD-10-CM

## 2019-10-12 LAB — POTASSIUM SERPL-SCNC: 5.6 MEQ/L (ref 3.6–5)

## 2019-10-14 ENCOUNTER — TELEPHONE (OUTPATIENT)
Dept: NEPHROLOGY | Age: 69
End: 2019-10-14

## 2019-10-14 DIAGNOSIS — E87.5 HYPERKALEMIA: Primary | ICD-10-CM

## 2019-10-21 LAB — POTASSIUM SERPL-SCNC: 5.1 MEQ/L (ref 3.6–5)

## 2019-10-22 ENCOUNTER — TELEPHONE (OUTPATIENT)
Dept: NEPHROLOGY | Age: 69
End: 2019-10-22

## 2020-04-23 RX ORDER — RAMIPRIL 2.5 MG/1
CAPSULE ORAL
Qty: 90 CAPSULE | Refills: 1 | Status: SHIPPED | OUTPATIENT
Start: 2020-04-23 | End: 2020-10-18 | Stop reason: SDUPTHER

## 2020-07-09 LAB
ANION GAP SERPL CALCULATED.3IONS-SCNC: 6 MMOL/L (ref 4–12)
BUN BLDV-MCNC: 35 MG/DL (ref 7–20)
CALCIUM SERPL-MCNC: 9 MG/DL (ref 8.8–10.5)
CHLORIDE BLD-SCNC: 107 MEQ/L (ref 101–111)
CO2: 24 MEQ/L (ref 21–32)
CREAT SERPL-MCNC: 2.04 MG/DL (ref 0.6–1.3)
CREATININE CLEARANCE: 32
GLUCOSE: 81 MG/DL (ref 70–110)
PARATHYROID HORMONE INTACT: 20.9 U/ML (ref 12–88)
POTASSIUM SERPL-SCNC: 5.4 MEQ/L (ref 3.6–5)
SODIUM BLD-SCNC: 137 MEQ/L (ref 135–145)
VITAMIN D 25-HYDROXY: 41.9 NG/ML (ref 30–100)

## 2020-07-10 ENCOUNTER — TELEPHONE (OUTPATIENT)
Dept: NEPHROLOGY | Age: 70
End: 2020-07-10

## 2020-07-10 NOTE — TELEPHONE ENCOUNTER
----- Message from Arabella Burton MD sent at 7/10/2020  5:37 AM EDT -----  Serum potassium is slightly high  Low potassium diet   Veltassa 8.4 gm daily for the days   Repeat serum potassium in 3 to 4 days

## 2020-07-13 RX ORDER — PATIROMER 8.4 G/1
POWDER, FOR SUSPENSION ORAL
Qty: 5 EACH | Refills: 0 | COMMUNITY
Start: 2020-07-13 | End: 2020-10-07 | Stop reason: ALTCHOICE

## 2020-07-13 NOTE — TELEPHONE ENCOUNTER
Flores Santana called back. I spoke to him about the information below. He will come to the office to get the samples. Samples are at the front window and the lab order. I spoke to Dr. Sidra Oneill. He wants Flores Santana to take this for 3 days then check potassium in 4 days.

## 2020-07-16 ENCOUNTER — OFFICE VISIT (OUTPATIENT)
Dept: NEPHROLOGY | Age: 70
End: 2020-07-16
Payer: MEDICARE

## 2020-07-16 VITALS
SYSTOLIC BLOOD PRESSURE: 114 MMHG | BODY MASS INDEX: 35.78 KG/M2 | DIASTOLIC BLOOD PRESSURE: 71 MMHG | WEIGHT: 263.8 LBS | HEART RATE: 73 BPM | OXYGEN SATURATION: 95 % | TEMPERATURE: 98.1 F

## 2020-07-16 PROBLEM — I10 ESSENTIAL HYPERTENSION: Status: ACTIVE | Noted: 2020-07-16

## 2020-07-16 PROCEDURE — 2022F DILAT RTA XM EVC RTNOPTHY: CPT | Performed by: INTERNAL MEDICINE

## 2020-07-16 PROCEDURE — 99213 OFFICE O/P EST LOW 20 MIN: CPT | Performed by: INTERNAL MEDICINE

## 2020-07-16 PROCEDURE — G8417 CALC BMI ABV UP PARAM F/U: HCPCS | Performed by: INTERNAL MEDICINE

## 2020-07-16 PROCEDURE — G8427 DOCREV CUR MEDS BY ELIG CLIN: HCPCS | Performed by: INTERNAL MEDICINE

## 2020-07-16 PROCEDURE — 1123F ACP DISCUSS/DSCN MKR DOCD: CPT | Performed by: INTERNAL MEDICINE

## 2020-07-16 PROCEDURE — 4040F PNEUMOC VAC/ADMIN/RCVD: CPT | Performed by: INTERNAL MEDICINE

## 2020-07-16 PROCEDURE — 1036F TOBACCO NON-USER: CPT | Performed by: INTERNAL MEDICINE

## 2020-07-16 PROCEDURE — 3017F COLORECTAL CA SCREEN DOC REV: CPT | Performed by: INTERNAL MEDICINE

## 2020-07-16 RX ORDER — GLIMEPIRIDE 2 MG/1
2 TABLET ORAL
COMMUNITY
Start: 2020-02-17

## 2020-07-16 RX ORDER — ISOSORBIDE MONONITRATE 60 MG/1
60 TABLET, EXTENDED RELEASE ORAL 2 TIMES DAILY
COMMUNITY
Start: 2020-07-01

## 2020-07-16 NOTE — PATIENT INSTRUCTIONS
Low Potassium Diet:        Foods are low in potassium. Plan to eat these every day. But don't eat more than 4 servings a day. A serving equals 1 small piece of fruit or ½ cup.  Fresh fruit: Apples, applesauce, blueberries, grapes, pineapple, plums, watermelon    Canned fruit: Peaches and pears.  Vegetables: Green or wax beans,broccoli, cabbage, carrots, corn, lettuce, radishes, green peas spinach.  Cheese, Pasta, rice, bread     Foods are high in potassium. Don't eat more than 1 serving of these a day. A serving equals 1 small piece of fruit or ½ cup.  Fresh fruit: Blackberries, boysenberries, cherries, grapefruit, strawberries   Vegetables: Asparagus, carrots, beets, corn, turnips, canned tomatoes, white mushrooms, and zucchini.  Milk and yogurt. Don't eat more than 1 cup a day.  Foods are very high in potassium. Avoid these foods.  Fruits: Apricots, bananas, dates, figs, honeydew, raisins, prunes, kiwi fruit, nectarines, oranges, and orange juice, watermelon   Vegetables: Avocados, artichokes, brussels sprouts, potatoes, leafy   green vegetables (such as spinach), winter squash, fresh tomatoes, tomato paste, yams, and dried peas, beans, and lentils.  Clams, chocolate, nuts, seeds, molasses, and sardines. Lower potassium in potatoes by \"leaching\". Boil the potatoes in water and then drain the liquid off. Repeat the rinse twice. Valeria Rodriguez Do not use     salt substitute or \"lite\" salt,     Osito Lite Salt    No Salt, Nu Salt.   These often are very high in potassium.       Mrs Veronica Garcia is ok to use since it does not contain potassium      Potassium Content of Foods   (listing by high to low content)    Food  Serving Size Potassium (mg)   Baked potato (flesh only) one medium 610   Sweet potato, baked one medium 542   Banana, raw  one medium 422   Spinach, cooked ½ C 420   [place]gadillo beans, cooked ½ C 373   Kidney beans, cooked ½ C 371   Lentils, cooked ½ C 366   Navy beans, cooked ½ C 354 Plums, dried, pitted five 350   Artichokes, cooked one medium 343   Mashed potatoes ½ C 343   Edamame/soybeans, green ½ C 338   Tomato, canned  ½ C 325   Raisins ¼ C 299   Tomato, raw one medium 292   Papaya one small 286   Peach one medium 285   Pistachios, dry roasted, salted  1 oz (47 nuts) 285   Pumpkin, cooked, mashed ½ C 282   French fries 10 fries 278   Mushrooms, white, cooked ½ C 278   Beets, cooked ½ C 259   Oakley sprouts, cooked ½ C 247   Kiwi one medium 237   Orange, raw one medium 237   Green peas, cooked ½ C 217   Cantaloupe, raw ½ C 214   Pear, raw one medium 212   Almonds, dry roasted 1 oz (24 nuts) 202   Apricot, canned, juice pack ½ C 202   Asparagus, cooked ½ C 202   [place] squash, cooked ½ C 201   Apple, raw with skin one medium 195   Honeydew ½ C 194   Carrots, cooked ½ C 183   Onions, cooked ½ C 175   Spinach, raw 1 C 167   Corn, sweet yellow ½ C 163   Red hernandez pepper ½ C 157   Kale, cooked ½ C 150   Cabbage, cooked ½ C 147   Mustard greens, cooked ½ C 142   Brandon ½ C 139   Figs, dried two figs 134   Summer squash, cooked ½ C 126   Grapes 10 grapes 120   Okra, cooked ½ C 108   Bamboo shoots, canned 1 C 108   Celery, raw one stalk 105   Peanut butter, creamy 1 Tbsp 104   Green beans, cooked ½ C 104   Cauliflower, cooked ½ C 91   Mushrooms, shitake, cooked ½ C 88   Watermelon ½ C 85   Cucumber, peeled ½ C 88   Iceberg lettuce 1 C 81   Tomato, sun dried one piece 80   Eggplant, cooked ½ C 69   Pickle one pickle 61   Ketchup 1 Tbsp 60   Radishes one radish 57     5   C=cup, mg=milligrams, oz=ounces, Tbsp=tablespoon    Reference  [place] Dept of Agriculture, Agricultural Research Service, [employer]  [employer] Database for Standard Reference, Release 25: Potassium, K (mg) content of selected foods per common measure

## 2020-07-16 NOTE — PROGRESS NOTES
Renal Progress Note    Assessment and Plan:      Diagnosis Orders   1. Chronic kidney disease, stage III (moderate) ([facility])     2. Anemia of chronic disease     3. Diabetes mellitus due to underlying condition with stage 3 chronic kidney disease, unspecified whether long term insulin use ([facility]     4. Essential hypertension        PLAN:  Reviewed labs with the patient   He understood   Addressed his questions   Serum creatinine is increased to 2.01 mg/dl from 1.69 mg/dl  Serum potassium is slightly high at 5.4 mEq/l  Given office samples of patiromer for three days   Repeat potassium pending  I will suggest holding cardiac cath till serum creatinine is below 2 mg/dL unless of course is emergent. This was discussed with the patient. Return visit in 6 months with labs. Patient Active Problem List   Diagnosis    Diverticula of colon    Wound, open, abdominal wall, anterior    Incisional hernia    CKD (chronic kidney disease) stage 3, GFR 30-59 ml/min ([facility])    Vitamin D deficiency    HTN (hypertension)    DM type 2 (diabetes mellitus, type 2) ([facility]    CAD (coronary artery disease)    Iron deficiency anemia    Hypertriglyceridemia    Hyperkalemia    Type 2 diabetes mellitus with stage 3 chronic kidney disease, without long-term current use of insulin ([facility])    Anemia, chronic disease    Recurrent incisional hernia    Essential hypertension       Subjective:   Chief complaint:  Chief Complaint   Patient presents with    Chronic Kidney Disease     Stage III      HPI:This is a follow up visit for Ms. Amanda Rodrigues who is here today for return appointment. I see him for chronic kidney disease. He was last seen in April 2019. Serum creatinine was 1.69 mg/dL. Now it is 2.04 mg/dL. Doing well otherwise with no complaint. No chest pain or shortness of breath. No fever or chills. No nausea vomiting. He has chronic joint pain. No modifying factor for the chronic joint pain.     ROS:Constitutional: negative  Eyes: negative  Ears, nose, mouth, throat, and face: negative  Respiratory: negative  Cardiovascular: negative  Gastrointestinal: negative  Genitourinary:negative  Integument/breast: negative  Hematologic/lymphatic: negative  Musculoskeletal:negative  Neurological: negative  Behavioral/Psych: negative  Endocrine: negative  Allergic/Immunologic: negative  Medications:     Current Outpatient Medications   Medication Sig Dispense Refill    isosorbide mononitrate (IMDUR) 60 MG extended release tablet TAKE 1 TABLET BY MOUTH ONCE DAILY IN THE MORNING FOR 90 DAYS      glimepiride (AMARYL) 2 MG tablet Take 2 mg by mouth every morning (before breakfast)      patiromer sorbitex calcium (VELTASSA) 8.4 g PACK packet Patient was given 5 packets for samples. He is to take a packet everyday for the next 3 days. Check potassium on 4th day and we will go from there. 5 each 0    ramipril (ALTACE) 2.5 MG capsule Take 1 capsule by mouth once daily 90 capsule 1    Cholecalciferol (VITAMIN D3) 5000 units TABS Take 500 Units by mouth daily      Misc Natural Products (GLUCOSAMINE CHOND COMPLEX/MSM PO) Take 1 tablet by mouth 2 times daily      rosuvastatin (CRESTOR) 20 MG tablet Take 20 mg by mouth daily       pioglitazone (ACTOS) 45 MG tablet Take 45 mg by mouth daily      metoprolol succinate (TOPROL XL) 25 MG extended release tablet Take 25 mg by mouth daily      ferrous sulfate 325 (65 FE) MG tablet Take 325 mg by mouth daily (with breakfast)      CVS VITAMIN B12 1000 MCG tablet TAKE 1 TABLET BY MOUTH EVERY DAY 90 tablet 3    Magnesium 500 MG TABS Take 500 mg by mouth 2 times daily       Multiple Vitamins-Minerals (PX MENS MULTIVITAMINS PO) Take  by mouth. otc-with iron       fenofibrate (TRICOR) 145 MG tablet Take 145 mg by mouth daily       aspirin 325 MG tablet Take 325 mg by mouth daily. Stop 1 week preop      Omega-3 Fatty Acids (FISH OIL) 1000 MG CAPS Take 1,000 mg by mouth 2 times daily.          No current facility-administered medications for this visit.         Lab Results:    CBC:   Lab Results   Component Value Date    WBC 8.0 10/31/2012    HGB 11.6 (A) 09/27/2017    HCT 34.7 (A) 09/27/2017    MCV 90.7 10/31/2012     10/31/2012     BMP:    Lab Results   Component Value Date     07/09/2020     07/09/2020     09/05/2019    K 5.4 (H) 07/09/2020    K 5.2 (H) 07/09/2020    K 5.1 (H) 10/21/2019     07/09/2020     07/09/2020     09/05/2019    CO2 24 07/09/2020    CO2 24 07/09/2020    CO2 26 09/05/2019    BUN 35 (H) 07/09/2020    BUN 35 (H) 07/09/2020    BUN 34 (H) 09/05/2019    CREATININE 2.04 (H) 07/09/2020    CREATININE 1.96 (H) 07/09/2020    CREATININE 1.87 (H) 09/05/2019    GLUCOSE 81 07/09/2020    GLUCOSE 80 07/09/2020    GLUCOSE 142 (H) 09/05/2019      Hepatic:   Lab Results   Component Value Date    AST 28 07/09/2020    AST 28 09/05/2019    AST 28 01/22/2019    ALT 17 07/09/2020    ALT 19 09/05/2019    ALT 16 01/22/2019    BILITOT 0.5 07/09/2020    BILITOT 0.6 09/05/2019    BILITOT 0.3 01/22/2019    ALKPHOS 37 (L) 07/09/2020    ALKPHOS 38 (L) 09/05/2019    ALKPHOS 42 01/22/2019     BNP: No results found for: BNP  Lipids:   Lab Results   Component Value Date    CHOL 123 07/09/2020    HDL 9 (L) 07/09/2020     INR:   Lab Results   Component Value Date    INR 1.02 10/30/2012     URINE: No results found for: NAUR, PROTUR  No results found for: NITRU, COLORU, PHUR, LABCAST, WBCUA, RBCUA, MUCUS, TRICHOMONAS, YEAST, BACTERIA, CLARITYU, SPECGRAV, LEUKOCYTESUR, UROBILINOGEN, BILIRUBINUR, BLOODU, GLUCOSEU, KETUA, AMORPHOUS   Microalbumen/Creatinine ratio:  No components found for: RUCREAT    Objective:   Vitals: /71 (Site: Right Upper Arm, Position: Sitting, Cuff Size: Large Adult)   Pulse 73   Temp 98.1 °F (36.7 °C)   Wt 263 lb 12.8 oz (119.7 kg)   SpO2 95%   BMI 35.78 kg/m²      Constitutional:  Alert, awake, no apparent distress  Skin:normal with no rash or any lesions  HEENT:Pupils are reactive . Throat is clear. Oral mucosa is moist.  Neck:supple with no thyromegaly or bruit   Cardiovascular:  S1, S2 without murmur   Respiratory:  Clear to auscultation with no wheezes or rales  Abdomen: +bowel sound, soft, non tender and no bruit  Ext: No LE edema  Musculoskeletal:Intact  Neuro:Alert, awake and oriented with no obvious focal deficit.   Speech is normal.    Electronically signed by Leo Davila MD on 7/16/2020 at 1:47 PM

## 2020-07-22 LAB — POTASSIUM SERPL-SCNC: 5 MEQ/L (ref 3.6–5)

## 2020-07-23 ENCOUNTER — TELEPHONE (OUTPATIENT)
Dept: NEPHROLOGY | Age: 70
End: 2020-07-23

## 2020-09-20 ENCOUNTER — HOSPITAL ENCOUNTER (INPATIENT)
Age: 70
LOS: 2 days | Discharge: HOME | DRG: 390 | End: 2020-09-22
Attending: SURGERY | Admitting: SURGERY
Payer: MEDICARE

## 2020-09-20 PROBLEM — K56.609 SBO (SMALL BOWEL OBSTRUCTION) (HCC): Status: ACTIVE | Noted: 2020-09-20

## 2020-09-20 PROCEDURE — 2580000003 HC RX 258: Performed by: NURSE PRACTITIONER

## 2020-09-20 PROCEDURE — APPSS60 APP SPLIT SHARED TIME 46-60 MINUTES: Performed by: PHYSICIAN ASSISTANT

## 2020-09-20 PROCEDURE — 99222 1ST HOSP IP/OBS MODERATE 55: CPT | Performed by: SURGERY

## 2020-09-20 PROCEDURE — 6360000002 HC RX W HCPCS: Performed by: NURSE PRACTITIONER

## 2020-09-20 PROCEDURE — 1200000000 HC SEMI PRIVATE

## 2020-09-20 PROCEDURE — 6370000000 HC RX 637 (ALT 250 FOR IP): Performed by: PHYSICIAN ASSISTANT

## 2020-09-20 PROCEDURE — 94760 N-INVAS EAR/PLS OXIMETRY 1: CPT

## 2020-09-20 PROCEDURE — C9113 INJ PANTOPRAZOLE SODIUM, VIA: HCPCS | Performed by: NURSE PRACTITIONER

## 2020-09-20 RX ORDER — ISOSORBIDE MONONITRATE 60 MG/1
60 TABLET, EXTENDED RELEASE ORAL DAILY
Status: DISCONTINUED | OUTPATIENT
Start: 2020-09-20 | End: 2020-09-22 | Stop reason: HOSPADM

## 2020-09-20 RX ORDER — METOPROLOL SUCCINATE 25 MG/1
25 TABLET, EXTENDED RELEASE ORAL DAILY
Status: DISCONTINUED | OUTPATIENT
Start: 2020-09-20 | End: 2020-09-22 | Stop reason: HOSPADM

## 2020-09-20 RX ORDER — MORPHINE SULFATE 2 MG/ML
2 INJECTION, SOLUTION INTRAMUSCULAR; INTRAVENOUS
Status: DISCONTINUED | OUTPATIENT
Start: 2020-09-20 | End: 2020-09-22 | Stop reason: HOSPADM

## 2020-09-20 RX ORDER — MORPHINE SULFATE 4 MG/ML
4 INJECTION, SOLUTION INTRAMUSCULAR; INTRAVENOUS
Status: DISCONTINUED | OUTPATIENT
Start: 2020-09-20 | End: 2020-09-22 | Stop reason: HOSPADM

## 2020-09-20 RX ORDER — SODIUM CHLORIDE 9 MG/ML
10 INJECTION INTRAVENOUS DAILY
Status: DISCONTINUED | OUTPATIENT
Start: 2020-09-20 | End: 2020-09-22 | Stop reason: HOSPADM

## 2020-09-20 RX ORDER — DEXTROSE MONOHYDRATE 50 MG/ML
100 INJECTION, SOLUTION INTRAVENOUS PRN
Status: DISCONTINUED | OUTPATIENT
Start: 2020-09-20 | End: 2020-09-22 | Stop reason: HOSPADM

## 2020-09-20 RX ORDER — ROSUVASTATIN CALCIUM 20 MG/1
20 TABLET, COATED ORAL NIGHTLY
Status: DISCONTINUED | OUTPATIENT
Start: 2020-09-20 | End: 2020-09-22 | Stop reason: HOSPADM

## 2020-09-20 RX ORDER — METOCLOPRAMIDE HYDROCHLORIDE 5 MG/ML
10 INJECTION INTRAMUSCULAR; INTRAVENOUS EVERY 6 HOURS PRN
Status: DISCONTINUED | OUTPATIENT
Start: 2020-09-20 | End: 2020-09-22 | Stop reason: HOSPADM

## 2020-09-20 RX ORDER — PANTOPRAZOLE SODIUM 40 MG/10ML
40 INJECTION, POWDER, LYOPHILIZED, FOR SOLUTION INTRAVENOUS DAILY
Status: DISCONTINUED | OUTPATIENT
Start: 2020-09-20 | End: 2020-09-22 | Stop reason: HOSPADM

## 2020-09-20 RX ORDER — SODIUM CHLORIDE 0.9 % (FLUSH) 0.9 %
10 SYRINGE (ML) INJECTION PRN
Status: DISCONTINUED | OUTPATIENT
Start: 2020-09-20 | End: 2020-09-22 | Stop reason: HOSPADM

## 2020-09-20 RX ORDER — DEXTROSE MONOHYDRATE 25 G/50ML
12.5 INJECTION, SOLUTION INTRAVENOUS PRN
Status: DISCONTINUED | OUTPATIENT
Start: 2020-09-20 | End: 2020-09-22 | Stop reason: HOSPADM

## 2020-09-20 RX ORDER — PIOGLITAZONEHYDROCHLORIDE 45 MG/1
45 TABLET ORAL DAILY
Status: DISCONTINUED | OUTPATIENT
Start: 2020-09-20 | End: 2020-09-22 | Stop reason: HOSPADM

## 2020-09-20 RX ORDER — SODIUM CHLORIDE 9 MG/ML
INJECTION, SOLUTION INTRAVENOUS CONTINUOUS
Status: DISCONTINUED | OUTPATIENT
Start: 2020-09-20 | End: 2020-09-22 | Stop reason: HOSPADM

## 2020-09-20 RX ORDER — GLIMEPIRIDE 2 MG/1
2 TABLET ORAL
Status: DISCONTINUED | OUTPATIENT
Start: 2020-09-21 | End: 2020-09-22 | Stop reason: HOSPADM

## 2020-09-20 RX ORDER — SODIUM CHLORIDE 0.9 % (FLUSH) 0.9 %
10 SYRINGE (ML) INJECTION EVERY 12 HOURS SCHEDULED
Status: DISCONTINUED | OUTPATIENT
Start: 2020-09-20 | End: 2020-09-22 | Stop reason: HOSPADM

## 2020-09-20 RX ORDER — NICOTINE POLACRILEX 4 MG
15 LOZENGE BUCCAL PRN
Status: DISCONTINUED | OUTPATIENT
Start: 2020-09-20 | End: 2020-09-22 | Stop reason: HOSPADM

## 2020-09-20 RX ORDER — ONDANSETRON 2 MG/ML
4 INJECTION INTRAMUSCULAR; INTRAVENOUS EVERY 6 HOURS PRN
Status: DISCONTINUED | OUTPATIENT
Start: 2020-09-20 | End: 2020-09-22 | Stop reason: HOSPADM

## 2020-09-20 RX ORDER — RAMIPRIL 2.5 MG/1
2.5 CAPSULE ORAL DAILY
Status: DISCONTINUED | OUTPATIENT
Start: 2020-09-20 | End: 2020-09-22 | Stop reason: HOSPADM

## 2020-09-20 RX ADMIN — SODIUM CHLORIDE: 9 INJECTION, SOLUTION INTRAVENOUS at 13:36

## 2020-09-20 RX ADMIN — PANTOPRAZOLE SODIUM 40 MG: 40 INJECTION, POWDER, FOR SOLUTION INTRAVENOUS at 09:20

## 2020-09-20 RX ADMIN — Medication 10 ML: at 09:21

## 2020-09-20 RX ADMIN — ROSUVASTATIN CALCIUM 20 MG: 20 TABLET, FILM COATED ORAL at 20:19

## 2020-09-20 RX ADMIN — SODIUM CHLORIDE: 9 INJECTION, SOLUTION INTRAVENOUS at 06:55

## 2020-09-20 RX ADMIN — PIOGLITAZONE 45 MG: 45 TABLET ORAL at 10:45

## 2020-09-20 RX ADMIN — SODIUM CHLORIDE: 9 INJECTION, SOLUTION INTRAVENOUS at 21:50

## 2020-09-20 RX ADMIN — ISOSORBIDE MONONITRATE 60 MG: 60 TABLET ORAL at 10:45

## 2020-09-20 RX ADMIN — RAMIPRIL 2.5 MG: 2.5 CAPSULE ORAL at 10:46

## 2020-09-20 RX ADMIN — METOPROLOL SUCCINATE 25 MG: 25 TABLET, FILM COATED, EXTENDED RELEASE ORAL at 10:45

## 2020-09-20 ASSESSMENT — PAIN SCALES - GENERAL
PAINLEVEL_OUTOF10: 0

## 2020-09-20 NOTE — PROGRESS NOTES
Pt admitted to  5K22 via from ED from direct admit: [facility]. Complaints: Abdominal pain. IV none infusing into the antecubital right, condition patent and no redness. IV site free of s/s of infection or infiltration. Vital signs obtained. Assessment and data collection initiated. Two nurse skin assessment performed by Orquidea Larsen RN and Gabbie Last RN. Oriented to room. Policies and procedures for 5K explained. All questions answered with no further questions at this time. Fall prevention and safety brochure discussed with patient. Bed alarm on. Call light in reach. Oriented to room. Vignesh John RN 9/20/2020 6:26 AM     Explained patients right to have family, representative or physician notified of their admission. Patient has Declined for physician to be notified. Patient has Declined for family/representative to be notified.

## 2020-09-20 NOTE — H&P
[address] Surgery History & Physical - Jean Orona  On behalf of Dr. China Bueno    Pt Name: Doyle Rivera  MRN: [id number]  YOB: 1950  Date of evaluation: 9/20/2020  Primary Care Physician: Brijesh Vo MD  [facility]  Reason for evaluation: SBO  IMPRESSIONS   1. CT evidence of moderate to high grade partial small bowel obstruction  2. Abdominal pain  3. History of colostomy, multiple abdominal surgeries  4.  has a past medical history of Blood transfusion, CAD (coronary artery disease), Diverticulitis, Hyperlipidemia, Hypertension, Kidney disease, S/P colostomy ([facility] [address] and Type II or unspecified type diabetes mellitus without mention of complication, not stated as uncontrolled. PLANS   1. Admit to med surg floor  2. Clear liquids  3. NPO and NG tube if develops nausea/vomiting  4. IV fluids   5. Serial abdominal exams   6. Repeat KUB in AM   7. Upload/review images from outside hospital  SUBJECTIVE   History of Chief Complaint:    Gem Crawford is a 65 y.o.male who [facility] of small bowel obstruction which was found on CT abdomen pelvis early this morning. Patient is known to the [address] [place] [address] Surgery practice and Dr. China Bueno. In September 2011, the patient underwent open sigmoid resection by Dr. Chad Rhodes secondary to diverticular disease. Miguel A Alexander had a delayed leak.  Required exploration with diversion.  In October 2012 he underwent ventral hernia repair and parastomal hernia repair with bilateral abdominal wall myocutaneous advancement flaps using biological mesh. In 2017 he was again admitted and underwent repair of recurrent incisional hernia x3 and primary repair of a small peristomal hernia. Patient states yesterday afternoon after working in his yard his abdomen became painful.  Patient reports he continued about his normal daily activities, and ate dinner in the evening time which worsened his pain. Patient reports he went to the ED early this morning because his pain became severe. Patient also noted that his abdomen seemed distended. Patient at that time had nausea, but denied vomiting. Patient states his colostomy has been functioning and that he has been passing gas. Past Medical History   has a past medical history of Blood transfusion, CAD (coronary artery disease), Diverticulitis, Hyperlipidemia, Hypertension, Kidney disease, S/P colostomy ([address] and Type II or unspecified type diabetes mellitus without mention of complication, not stated as uncontrolled. Past Surgical History   has a past surgical history that includes Coronary artery bypass graft (2005); Rotator cuff repair (2000); Tonsillectomy; Appendectomy (2011); knee surgery; Colonoscopy (2007); Cardiac catheterization (Sept 2012); other surgical history (10-30-12 (Dr. Huerta Flair)); Colon surgery (2011 x2); hernia repair (10-30-12); cardiovascular stress test (2017); pr colonoscopy flx dx w/collj spec when pfrmd (Left, 10/5/2017); Colonoscopy (10/5/2017); pr repair incisional hernia,reducible (N/A, 10/26/2017); pr office/outpt visit,procedure only (Left, 10/10/2018); Skin cancer excision; and pacemaker placement. Medications  Prior to Admission medications    Medication Sig Start Date End Date Taking?  Authorizing Provider   isosorbide mononitrate (IMDUR) 60 MG extended release tablet TAKE 1 TABLET BY MOUTH ONCE DAILY IN THE MORNING FOR 90 DAYS 7/1/20  Yes Historical Provider, MD   glimepiride (AMARYL) 2 MG tablet Take 2 mg by mouth every morning (before breakfast) 2/17/20  Yes Historical Provider, MD   ramipril (ALTACE) 2.5 MG capsule Take 1 capsule by mouth once daily 4/23/20  Yes Pattie Angela MD   Cholecalciferol (VITAMIN D3) 5000 units TABS Take 500 Units by mouth daily   Yes Historical Provider, MD   rosuvastatin (CRESTOR) 20 MG tablet Take 20 mg by mouth daily  7/25/18  Yes Historical Provider, MD   pioglitazone (ACTOS) 45 MG tablet Take 45 mg by mouth daily   Yes Historical Provider, MD   metoprolol succinate (TOPROL XL) 25 MG extended release tablet Take 25 mg by mouth daily   Yes Historical Provider, MD   ferrous sulfate 325 (65 FE) MG tablet Take 325 mg by mouth daily (with breakfast)   Yes Historical Provider, MD   CVS VITAMIN B12 1000 MCG tablet TAKE 1 TABLET BY MOUTH EVERY DAY 11/4/15  Yes Wilhemchiqui Medicus, MD   Magnesium 500 MG TABS Take 500 mg by mouth 2 times daily    Yes Historical Provider, MD   Multiple Vitamins-Minerals (PX MENS MULTIVITAMINS PO) Take  by mouth. otc-with iron    Yes Historical Provider, MD   fenofibrate (TRICOR) 145 MG tablet Take 145 mg by mouth daily    Yes Historical Provider, MD   aspirin 325 MG tablet Take 325 mg by mouth daily. Stop 1 week preop   Yes Historical Provider, MD   Omega-3 Fatty Acids (FISH OIL) 1000 MG CAPS Take 1,000 mg by mouth 2 times daily. Yes Historical Provider, MD   patiromer sorbitex calcium (VELTASSA) 8.4 g PACK packet Patient was given 5 packets for samples. He is to take a packet everyday for the next 3 days. Check potassium on 4th day and we will go from there. 7/13/20   Toan Wong MD   Misc Natural Products (GLUCOSAMINE CHOND COMPLEX/MSM PO) Take 1 tablet by mouth 2 times daily    Historical Provider, MD    Scheduled Meds:   sodium chloride flush  10 mL Intravenous 2 times per day    pantoprazole  40 mg Intravenous Daily    And    sodium chloride (PF)  10 mL Intravenous Daily     Continuous Infusions:   sodium chloride 125 mL/hr at 09/20/20 0655     PRN Meds:.sodium chloride flush, morphine **OR** morphine, ondansetron, metoclopramide  Allergies  is allergic to allegra [fexofenadine hydrochloride] and flagyl [metronidazole].   Family History  family history includes Asthma in his brother; Cancer in his mother; Diabetes in his mother; Heart Disease in his brother and father; High Blood Pressure in his mother; High Cholesterol in his mother; Stroke in his mother. Social History   reports that he has never smoked. He has never used smokeless tobacco. He reports that he does not drink alcohol or use drugs. Review of Systems:  General Denies any fever or chills. HEENT Denies any diplopia, tinnitus or vertigo. Resp Denies any shortness of breath, cough or wheezing. Cardiac Denies any chest pain, palpitations, claudication or edema. GI Denies any melena, hematochezia, hematemesis or pyrosis.  Denies any frequency, urgency, hesitancy or incontinence. Heme Denies bruising or bleeding easily. Neuro Denies any focal motor or sensory deficits. OBJECTIVE   CURRENT VITALS:  height is 6' (1.829 m) and weight is 263 lb 8 oz (119.5 kg). His oral temperature is 98.4 °F (36.9 °C). His blood pressure is 123/60 and his pulse is 89. His respiration is 18 and oxygen saturation is 92%. Body mass index is 35.74 kg/m². Temperature Range (24h):Temp: 98.4 °F (36.9 °C) Temp  Av.5 °F (36.9 °C)  Min: 98.4 °F (36.9 °C)  Max: 98.5 °F (36.9 °C)  BP Range (12X): Systolic (16EOY), QIJ:866 , Min:123 , CYJ:592     Diastolic (98JLT), IGN:89, Min:60, Max:68    Pulse Range (24h): Pulse  Av.5  Min: 89  Max: 94  Respiration Range (24h): Resp  Av  Min: 16  Max: 18  Current Pulse Ox (24h):  SpO2: 92 %  Pulse Ox Range (24h):  SpO2  Av %  Min: 92 %  Max: 94 %  Oxygen Amount and Delivery:    CONSTITUTIONAL: Alert and oriented times 3, no acute distress and cooperative to examination with proper mood and affect. SKIN: Skin color, texture, turgor normal. No rashes or lesions. LYMPH: No cervical nodes. HEENT: Head is normocephalic, atraumatic. EOMI, PERRL. NECK: Supple, symmetrical, trachea midline, no adenopathy, thyroid symmetric, not enlarged and no tenderness, skin normal.  CHEST/LUNGS: Chest symmetric with normal A/P diameter, normal respiratory rate and rhythm, lungs clear to auscultation without wheezes, rales or rhonchi.  No accessory muscle use.  CARDIOVASCULAR: Heart sounds are normal.  Regular rate and rhythm without murmur, gallop or rub. Normal S1 and S2. Carotid and femoral pulses 2+/4 and equal bilaterally. ABDOMEN: Obese abdomen. Softly distended abdomen. Colostomy present and functioning. Mid abdomen is tender to palpation. Tinkling bowel sounds LUQ. NEUROLOGIC: There are no focalizing motor or sensory deficits. CN II-XII are grossly intact. EXTREMITIES: no cyanosis and no clubbing. LABS   No results for input(s): WBC, HGB, HCT, PLT, NA, K, CL, CO2, BUN, CREATININE, MG, PHOS, CALCIUM, PTT, INR, AST, ALT, BILITOT, BILIDIR, AMYLASE, LIPASE, LDH, LACTA, NITRU, COLORU, BACTERIA in the last 72 hours. Invalid input(s): PT, WBCU, RBCU, LEUKOCYTESUA  RADIOLOGY     XR ABDOMEN (KUB) (SINGLE AP VIEW)    (Results Pending)   CT INTERPRETATION OF OUTSIDE IMAGES    (Results Pending)     Thank you for the interesting evaluation. Further recommendations to follow. Electronically signed by Shayla Crzu PA-C on 9/20/2020 at 9:56 AM     Patient seen and examined independently by me early this AM. Above discussed and I agree with LEROY Anderson. See my additional comments below for updated orders and plan. Labs, cultures, and radiographs where available were reviewed. I discussed patient concerns with the patient's nurse and instructions were given. Please see our orders for the updated patient care plan. -Patient's ostomy starting to function. He denies any type of abdominal pain or tenderness this morning. Okay to hold on NG tube decompression. Serial abdominal examination. It does well throughout the day then okay with clear liquids. KUB in a.m.  A.m. labs. DVT prophylaxis. Upload outside imaging to radiology. Discussed with patient the pros and cons of surgical intervention versus conservative treatment plan. Patient in agreement to continue with conservative treatment plan as he is feeling much better and ostomy starting to function. Denies nausea or vomiting. If things worsen then possible exploration needed at that time.     Electronically signed by Blake Guan MD on 9/20/20 at 8:58 PM EDT

## 2020-09-21 ENCOUNTER — APPOINTMENT (OUTPATIENT)
Dept: GENERAL RADIOLOGY | Age: 70
DRG: 390 | End: 2020-09-21
Attending: SURGERY
Payer: MEDICARE

## 2020-09-21 LAB
ALBUMIN SERPL-MCNC: 3 G/DL (ref 3.5–5.1)
ALP BLD-CCNC: 36 U/L (ref 38–126)
ALT SERPL-CCNC: 15 U/L (ref 11–66)
ANION GAP SERPL CALCULATED.3IONS-SCNC: 6 MEQ/L (ref 8–16)
AST SERPL-CCNC: 25 U/L (ref 5–40)
BILIRUB SERPL-MCNC: 0.5 MG/DL (ref 0.3–1.2)
BUN BLDV-MCNC: 20 MG/DL (ref 7–22)
CALCIUM SERPL-MCNC: 8.4 MG/DL (ref 8.5–10.5)
CHLORIDE BLD-SCNC: 108 MEQ/L (ref 98–111)
CO2: 24 MEQ/L (ref 23–33)
CREAT SERPL-MCNC: 1.6 MG/DL (ref 0.4–1.2)
ERYTHROCYTE [DISTWIDTH] IN BLOOD BY AUTOMATED COUNT: 14.6 % (ref 11.5–14.5)
ERYTHROCYTE [DISTWIDTH] IN BLOOD BY AUTOMATED COUNT: 52.3 FL (ref 35–45)
GFR SERPL CREATININE-BSD FRML MDRD: 43 ML/MIN/1.73M2
GLUCOSE BLD-MCNC: 102 MG/DL (ref 70–108)
GLUCOSE BLD-MCNC: 126 MG/DL (ref 70–108)
GLUCOSE BLD-MCNC: 142 MG/DL (ref 70–108)
GLUCOSE BLD-MCNC: 146 MG/DL (ref 70–108)
GLUCOSE BLD-MCNC: 99 MG/DL (ref 70–108)
GLUCOSE BLD-MCNC: 99 MG/DL (ref 70–108)
HCT VFR BLD CALC: 33.7 % (ref 42–52)
HEMOGLOBIN: 10.6 GM/DL (ref 14–18)
MCH RBC QN AUTO: 30.5 PG (ref 26–33)
MCHC RBC AUTO-ENTMCNC: 31.5 GM/DL (ref 32.2–35.5)
MCV RBC AUTO: 97.1 FL (ref 80–94)
PLATELET # BLD: 136 THOU/MM3 (ref 130–400)
PMV BLD AUTO: 10.7 FL (ref 9.4–12.4)
POTASSIUM REFLEX MAGNESIUM: 4.6 MEQ/L (ref 3.5–5.2)
RBC # BLD: 3.47 MILL/MM3 (ref 4.7–6.1)
SODIUM BLD-SCNC: 138 MEQ/L (ref 135–145)
TOTAL PROTEIN: 5.9 G/DL (ref 6.1–8)
WBC # BLD: 3.5 THOU/MM3 (ref 4.8–10.8)

## 2020-09-21 PROCEDURE — 1200000000 HC SEMI PRIVATE

## 2020-09-21 PROCEDURE — C9113 INJ PANTOPRAZOLE SODIUM, VIA: HCPCS | Performed by: NURSE PRACTITIONER

## 2020-09-21 PROCEDURE — 2580000003 HC RX 258: Performed by: NURSE PRACTITIONER

## 2020-09-21 PROCEDURE — 6370000000 HC RX 637 (ALT 250 FOR IP): Performed by: PHYSICIAN ASSISTANT

## 2020-09-21 PROCEDURE — 74018 RADEX ABDOMEN 1 VIEW: CPT

## 2020-09-21 PROCEDURE — 6360000002 HC RX W HCPCS: Performed by: NURSE PRACTITIONER

## 2020-09-21 PROCEDURE — 82948 REAGENT STRIP/BLOOD GLUCOSE: CPT

## 2020-09-21 PROCEDURE — 36415 COLL VENOUS BLD VENIPUNCTURE: CPT

## 2020-09-21 PROCEDURE — 99232 SBSQ HOSP IP/OBS MODERATE 35: CPT | Performed by: SURGERY

## 2020-09-21 PROCEDURE — APPSS30 APP SPLIT SHARED TIME 16-30 MINUTES: Performed by: NURSE PRACTITIONER

## 2020-09-21 PROCEDURE — 80053 COMPREHEN METABOLIC PANEL: CPT

## 2020-09-21 PROCEDURE — 85027 COMPLETE CBC AUTOMATED: CPT

## 2020-09-21 RX ORDER — ACETAMINOPHEN 325 MG/1
650 TABLET ORAL EVERY 4 HOURS PRN
Status: DISCONTINUED | OUTPATIENT
Start: 2020-09-21 | End: 2020-09-22 | Stop reason: HOSPADM

## 2020-09-21 RX ADMIN — RAMIPRIL 2.5 MG: 2.5 CAPSULE ORAL at 09:26

## 2020-09-21 RX ADMIN — ACETAMINOPHEN 650 MG: 325 TABLET ORAL at 05:51

## 2020-09-21 RX ADMIN — PANTOPRAZOLE SODIUM 40 MG: 40 INJECTION, POWDER, FOR SOLUTION INTRAVENOUS at 09:26

## 2020-09-21 RX ADMIN — ROSUVASTATIN CALCIUM 20 MG: 20 TABLET, FILM COATED ORAL at 19:58

## 2020-09-21 RX ADMIN — Medication 10 ML: at 09:27

## 2020-09-21 RX ADMIN — GLIMEPIRIDE 2 MG: 2 TABLET ORAL at 14:06

## 2020-09-21 RX ADMIN — PIOGLITAZONE 45 MG: 45 TABLET ORAL at 14:06

## 2020-09-21 RX ADMIN — METOPROLOL SUCCINATE 25 MG: 25 TABLET, FILM COATED, EXTENDED RELEASE ORAL at 09:26

## 2020-09-21 RX ADMIN — SODIUM CHLORIDE: 9 INJECTION, SOLUTION INTRAVENOUS at 19:59

## 2020-09-21 RX ADMIN — SODIUM CHLORIDE: 9 INJECTION, SOLUTION INTRAVENOUS at 05:11

## 2020-09-21 RX ADMIN — ISOSORBIDE MONONITRATE 60 MG: 60 TABLET ORAL at 09:26

## 2020-09-21 ASSESSMENT — PAIN SCALES - GENERAL
PAINLEVEL_OUTOF10: 0
PAINLEVEL_OUTOF10: 3

## 2020-09-21 ASSESSMENT — PAIN DESCRIPTION - PAIN TYPE: TYPE: ACUTE PAIN

## 2020-09-21 ASSESSMENT — PAIN DESCRIPTION - LOCATION: LOCATION: HEAD

## 2020-09-21 NOTE — CARE COORDINATION
20, 8:33 AM EDT  DISCHARGE PLANNING EVALUATION:    Arabella Lockhart       Admitted from: DA 2020/ [facility] day: 1   Location: [address] [place] [address] Reason for admit: SBO (small bowel obstruction) ([facility] [K56.609] Status: IP  Admit order signed?: yes  PMH:  has a past medical history of Blood transfusion, CAD (coronary artery disease), Diverticulitis, Hyperlipidemia, Hypertension, Kidney disease, S/P colostomy ([facility] and Type II or unspecified type diabetes mellitus without mention of complication, not stated as uncontrolled. Procedure: na  Pertinent abnormal Imagin/21 KUB  Nonobstructive bowel gas pattern. Medications:  Scheduled Meds:   sodium chloride flush  10 mL Intravenous 2 times per day    pantoprazole  40 mg Intravenous Daily    And    sodium chloride (PF)  10 mL Intravenous Daily    metoprolol succinate  25 mg Oral Daily    glimepiride  2 mg Oral QAM AC    rosuvastatin  20 mg Oral Nightly    pioglitazone  45 mg Oral Daily    isosorbide mononitrate  60 mg Oral Daily    ramipril  2.5 mg Oral Daily     Continuous Infusions:   sodium chloride 125 mL/hr at 20 0511    dextrose        Pertinent Info/Orders/Treatment Plan:  DA from [facility]    On clear liquids, advance to full liquids, I/O, creatinine 1.6, WBC 3.5, IVF, PPI IV, pain control, diabetic management, ambulate as ordered. Diet: DIET CLEAR LIQUID; Carb Control: 4 carb choices (60 gms)/meal   Smoking status:  reports that he has never smoked.  He has never used smokeless tobacco.   PCP: Frank Macario MD  Readmission 30 days or less: no  Readmission Risk Score: 13%    Discharge Planning Evaluation  Current Residence:  Private Residence  Living Arrangements:  Spouse/Significant Other   Support Systems:  Friends/Neighbors, Spouse/Significant Other  Current Services PTA:     Potential Assistance Needed:  N/A  Potential Assistance Purchasing Medications:  No  Does patient want to participate in local refill/ meds to beds program?  No  Type of Home Care Services:  None  Patient expects to be discharged to:  Home with wife  Expected Discharge date:  09/22/20  Follow Up Appointment: Best Day/ Time: Tuesday AM    Patient Goals/Plan/Treatment Preferences: Met with [place]; he resides home with his wife who is a retired RN. He has transportation, has PCP, uses no DME, and declines HH or needs when discharged. Transportation/Food Security/Housekeeping Addressed:  No issues identified.     Evaluation: no

## 2020-09-21 NOTE — PROGRESS NOTES
Cecelia Thakkar - Dr. Dweey Ham  Daily Progress Note    Pt Name: Daily Robles  Medical Record Number: [id number]  Date of Birth 1950   Today's Date: 9/21/2020    Hospital day # 1     ASSESSMENT   1. Moderate to high grade partial small bowel obstruction  2. Abdominal pain  3. History of multiple abdominal surgeries  4. Obesity  5. Acute on chronic kidney insufficiency  6. Diabetes Mellitus    has a past medical history of Blood transfusion, CAD (coronary artery disease), Diverticulitis, Hyperlipidemia, Hypertension, Kidney disease, S/P colostomy ([facility] [address] and Type II or unspecified type diabetes mellitus without mention of complication, not stated as uncontrolled. PLAN   1. Advance to full liquids. If tolerates okay for lunch then may have soft for supper. 2. KUB this morning with nonobstructive bowel gas pattern  3. Ostomy functioning  4. Up as tolerated  5. Decrease IV fluids   6. DVT prophylaxis with SCDs  7. Labs reviewed. Creatinine 1.6 but at his baseline with chronic disease. Repeat in am.   8. Abdomen benign. Continue conservative management. No acute surgical intervention. Pending on how patient does with increased diet home later tonight/tomorrow. SUBJECTIVE   Chief complaint: No complaints    Patient was stable overnight. Chart reviewed. Updated by nursing staff. Denies chest discomfort or dyspnea. No N/V; (+) belching, flatus and ostomy starting to functioning. Feels better. Tolerated clear liquids and would like diet increased. No urinary complaints. Denies any abdominal pain/tenderness.    CURRENT MEDICATIONS   Scheduled Meds:   sodium chloride flush  10 mL Intravenous 2 times per day    pantoprazole  40 mg Intravenous Daily    And    sodium chloride (PF)  10 mL Intravenous Daily    metoprolol succinate  25 mg Oral Daily    glimepiride  2 mg Oral QAM AC    rosuvastatin  20 mg Oral Nightly    pioglitazone  45 mg Oral Daily    isosorbide mononitrate 60 mg Oral Daily    ramipril  2.5 mg Oral Daily     Continuous Infusions:   sodium chloride 50 mL/hr at 20 0913    dextrose       PRN Meds:.acetaminophen, sodium chloride flush, morphine **OR** morphine, ondansetron, metoclopramide, glucose, dextrose, glucagon (rDNA), dextrose  OBJECTIVE   CURRENT VITALS:  height is 6' (1.829 m) and weight is 263 lb 4 oz (119.4 kg). His oral temperature is 97.5 °F (36.4 °C). His blood pressure is 139/64 and his pulse is 65. His respiration is 16 and oxygen saturation is 96%. Temperature Range (24h):Temp: 97.5 °F (36.4 °C) Temp  Av.8 °F (36.6 °C)  Min: 97.5 °F (36.4 °C)  Max: 98 °F (36.7 °C)  BP Range (92H): Systolic (55TNM), ZUD:266 , Min:100 , UYK:923     Diastolic (97LKH), RRI:92, Min:50, Max:65    Pulse Range (24h): Pulse  Av.8  Min: 65  Max: 75  Respiration Range (24h): Resp  Av  Min: 16  Max: 18  Current Pulse Ox (24h):  SpO2: 96 %  Pulse Ox Range (24h):  SpO2  Av.5 %  Min: 94 %  Max: 96 %  Oxygen Amount and Delivery:    Incentive Spirometry Tx:            GENERAL: alert, cooperative, no distress  SKIN: Skin color, texture, turgor normal. No rashes or lesions. HEENT: Head is normocephalic, atraumatic. NECK: Supple, symmetrical, trachea midline  LUNGS: clear to ausculation, without wheezes, rales or rhonci  HEART: normal rate and regular rhythm  ABDOMEN: soft, obese, non-tender, non-distended, bowel sounds present. Ostomy functioning with liquid stool. NEUROLOGIC: There are no focalizing motor or sensory deficits. CN II-XII are grossly intact. [place] EXTREMITIES: no cyanosis, no clubbing and no edema. In: 1836.7 [P.O.:800;  I.V.:1036.7]  Out: 0     LABS     Recent Labs     09/21/20  0716   WBC 3.5*   HGB 10.6*   HCT 33.7*         K 4.6      CO2 24   BUN 20   CREATININE 1.6*   CALCIUM 8.4*      Recent Labs     20  0716   AST 25   ALT 15   BILITOT 0.5     RADIOLOGY     PROCEDURE: XR ABDOMEN (KUB) (SINGLE AP VIEW)      CLINICAL INFORMATION: Small bowel obstruction         TECHNIQUE: 3 AP supine abdominal radiographs         COMPARISON: None         FINDINGS: Evaluation is limited by patient's obese body habitus. Bowel gas pattern is nonobstructive. There is contrast from a prior study in the colon. Surgical clips are present in the left hemiabdomen. There fluid bolus in the pelvis. Surgical clips    are also noted in the right inguinal region. Degenerative changes in the thoracolumbar spine are poorly visualized.              Impression         Nonobstructive bowel gas pattern.         Final report electronically signed by Dr. Karen Horvath on 9/21/2020 7:21 AM      Electronically signed by OLGA Beckett CNP on 9/21/2020 at 9:46 AM     Patient seen and examined independently by me early this AM. Above discussed and I agree with Dinah House CNP. See my additional comments below for updated orders and plan. Labs, cultures, and radiographs where available were reviewed. I discussed patient concerns with the patient's nurse and instructions were given. Please see our orders for the updated patient care plan. -Bowel function has returned well over the last 24 hours. Abdomen completely benign. Nontender. No pain. No nausea or vomiting. Tolerating clear liquids. Full liquids now. Hopefully soft/regular diet for dinner. KUB nonobstructive gas pattern. Ambulate. Decrease IV fluids. DVT prophylaxis. Most likely home later today/tomorrow patient continues to improve. Follow-up in office in 7-10 days.     Electronically signed by Karmen James MD on 9/21/20 at 1:07 PM EDT

## 2020-09-22 ENCOUNTER — APPOINTMENT (OUTPATIENT)
Dept: CT IMAGING | Age: 70
DRG: 390 | End: 2020-09-22
Attending: SURGERY
Payer: MEDICARE

## 2020-09-22 VITALS
OXYGEN SATURATION: 97 % | HEIGHT: 72 IN | DIASTOLIC BLOOD PRESSURE: 68 MMHG | SYSTOLIC BLOOD PRESSURE: 160 MMHG | WEIGHT: 263.25 LBS | HEART RATE: 67 BPM | RESPIRATION RATE: 16 BRPM | TEMPERATURE: 98 F | BODY MASS INDEX: 35.66 KG/M2

## 2020-09-22 LAB — GLUCOSE BLD-MCNC: 123 MG/DL (ref 70–108)

## 2020-09-22 PROCEDURE — 99232 SBSQ HOSP IP/OBS MODERATE 35: CPT | Performed by: SURGERY

## 2020-09-22 PROCEDURE — 6370000000 HC RX 637 (ALT 250 FOR IP): Performed by: PHYSICIAN ASSISTANT

## 2020-09-22 PROCEDURE — 82948 REAGENT STRIP/BLOOD GLUCOSE: CPT

## 2020-09-22 PROCEDURE — 99239 HOSP IP/OBS DSCHRG MGMT >30: CPT | Performed by: NURSE PRACTITIONER

## 2020-09-22 PROCEDURE — 3209999900 CT INTERPRETATION OF OUTSIDE IMAGES

## 2020-09-22 RX ADMIN — GLIMEPIRIDE 2 MG: 2 TABLET ORAL at 06:12

## 2020-09-22 RX ADMIN — METOPROLOL SUCCINATE 25 MG: 25 TABLET, FILM COATED, EXTENDED RELEASE ORAL at 08:17

## 2020-09-22 RX ADMIN — PIOGLITAZONE 45 MG: 45 TABLET ORAL at 08:17

## 2020-09-22 RX ADMIN — RAMIPRIL 2.5 MG: 2.5 CAPSULE ORAL at 08:17

## 2020-09-22 RX ADMIN — ISOSORBIDE MONONITRATE 60 MG: 60 TABLET ORAL at 08:17

## 2020-09-22 ASSESSMENT — PAIN SCALES - GENERAL
PAINLEVEL_OUTOF10: 0
PAINLEVEL_OUTOF10: 0

## 2020-09-22 NOTE — CARE COORDINATION
.9/22/20, 8:56 AM EDT    Patient goals/plan/ treatment preferences discussed by  and . Patient goals/plan/ treatment preferences reviewed with patient/ family. Patient/ family verbalize understanding of discharge plan and are in agreement with goal/plan/treatment preferences. Understanding was demonstrated using the teach back method. AVS provided by RN at time of discharge, which includes all necessary medical information pertaining to the patients current course of illness, treatment, post-discharge goals of care, and treatment preferences. Patient planning home with wife who is a retired nurse, [facility] or [facility].         IMM Letter  IMM Letter given to Patient/Family/Significant other/Guardian/POA/by[de-identified] staff  IMM Letter date given[de-identified] 09/20/20  IMM Letter time given[de-identified] 9390

## 2020-09-22 NOTE — PROGRESS NOTES
Maxi Zepeda - Dr. Tamia Pereyra  Daily Progress Note    Pt Name: Rosi Treviño  Medical Record Number: [id number]  Date of Birth 1950   Today's Date: 9/22/2020    Hospital day # 2     ASSESSMENT   1. Moderate to high grade partial small bowel obstruction  2. Abdominal pain  3. History of multiple abdominal surgeries  4. Obesity  5. Acute on chronic kidney insufficiency  6. Diabetes Mellitus    has a past medical history of Blood transfusion, CAD (coronary artery disease), Diverticulitis, Hyperlipidemia, Hypertension, Kidney disease, S/P colostomy ([facility] [address] and Type II or unspecified type diabetes mellitus without mention of complication, not stated as uncontrolled. PLAN   1. Tolerating diet. Abdomen benign. Ostomy function returned back to baseline. 2. KUB yesterday with nonobstructive bowel gas pattern  3. Ambulating  4. Up as tolerated  5. Stop IV fluids   6. DVT prophylaxis with SCDs  7. A.m. labs pending. 8. Home this morning. Follow-up in office in 1 week  SUBJECTIVE   Chief complaint: No complaints other than ready to go home    Patient was stable overnight. Chart reviewed. Updated by nursing staff. Denies chest discomfort or dyspnea. No N/V; (+) belching, flatus and ostomy functioning well. Feels better. Tolerating regular diet. No urinary complaints. Denies any abdominal pain/tenderness. Patient states he is ready to go home and feels good.   CURRENT MEDICATIONS   Scheduled Meds:   sodium chloride flush  10 mL Intravenous 2 times per day    pantoprazole  40 mg Intravenous Daily    And    sodium chloride (PF)  10 mL Intravenous Daily    metoprolol succinate  25 mg Oral Daily    glimepiride  2 mg Oral QAM AC    rosuvastatin  20 mg Oral Nightly    pioglitazone  45 mg Oral Daily    isosorbide mononitrate  60 mg Oral Daily    ramipril  2.5 mg Oral Daily     Continuous Infusions:   sodium chloride 50 mL/hr at 09/21/20 1959    dextrose       PRN AST 25   ALT 15   BILITOT 0.5     RADIOLOGY     PROCEDURE: XR ABDOMEN (KUB) (SINGLE AP VIEW)         CLINICAL INFORMATION: Small bowel obstruction         TECHNIQUE: 3 AP supine abdominal radiographs         COMPARISON: None         FINDINGS: Evaluation is limited by patient's obese body habitus. Bowel gas pattern is nonobstructive. There is contrast from a prior study in the colon. Surgical clips are present in the left hemiabdomen. There fluid bolus in the pelvis. Surgical clips    are also noted in the right inguinal region.  Degenerative changes in the thoracolumbar spine are poorly visualized.              Impression         Nonobstructive bowel gas pattern.         Final report electronically signed by Dr. Driss Dyer on 9/21/2020 7:21 AM      Electronically signed by Stewart Marr MD on 9/22/2020 at 6:25 AM

## 2020-09-22 NOTE — PROGRESS NOTES
All discharge instructions given to patient and family with no further questions at this time. Patient discharged off unit via wheelchair. Chart contents placed in yellow bin.

## 2020-09-22 NOTE — PLAN OF CARE
Problem: Bowel/Gastric:  Goal: Ability to achieve a regular elimination pattern will improve  Description: Ability to achieve a regular elimination pattern will improve  9/21/2020 1028 by Carina Schmidt RN  Outcome: Ongoing  Note: Colostomy bag in place. Will monitor for bowel movement. Problem: Sensory:  Goal: Pain level will decrease  Description: Pain level will decrease  9/21/2020 1028 by Carina Schmidt RN  Outcome: Ongoing  Note: Denies pain at this time. Will continue to monitor. Problem: Falls - Risk of:  Goal: Will remain free from falls  Description: Will remain free from falls  9/21/2020 1028 by Carina Schmidt RN  Outcome: Ongoing  Note: Bed in lowest position. Call light within reach. Educated patient to use call light for assistance. Bed alarm in place. Problem: Pain:  Goal: Pain level will decrease  Description: Pain level will decrease  9/21/2020 1028 by Carina Schmidt RN  Outcome: Ongoing  Note: Denies pain at this time. Will continue to monitor. Care plan reviewed with patient. Patient verbalize understanding of the plan of care and contribute to goal setting.
Problem: Bowel/Gastric:  Goal: Ability to achieve a regular elimination pattern will improve  Description: Ability to achieve a regular elimination pattern will improve  Outcome: Ongoing  Note: Patient has colostomy. Stated his abdomen was firm on admission with c/o pain and nausea. While here, pt had hard stool in colostomy. Has since had two BM in colostomy that are more loose and baseline. Problem: Bowel/Gastric:  Goal: Bowel function will improve  Description: Bowel function will improve  Outcome: Ongoing  Note: Patient has stool in colostomy bag. C/O nausea has diminished. Pain has diminished. Problem: Bowel/Gastric:  Goal: Occurrences of nausea will decrease  Description: Occurrences of nausea will decrease  Outcome: Ongoing  Note: Patient has no complaints of nausea this shift. Problem: Sensory:  Goal: Pain level will decrease  Description: Pain level will decrease  Outcome: Ongoing  Note: Patient denies pain this shift. Problem: Falls - Risk of:  Goal: Will remain free from falls  Description: Will remain free from falls  Outcome: Ongoing  Note: Patient remained free from falls this shift. Bed is in low position with alarm on and siderails up x2. Patient ambulates with one assist. Education given on use of call light and patient voiced understanding. Patient uses call light appropriately. Call light and beside table within reach. Arm band and falling star in place. Problem: Falls - Risk of:  Goal: Absence of physical injury  Description: Absence of physical injury  Outcome: Ongoing  Note: Patient remains free from falls. Adequately uses call light. Ambulates with stand-by assist. No evidence of new skin breakdown this shift. Plan of Care discussed with patient. They verbalized understanding.
of the plan of care and contribute to goal setting.

## 2020-09-23 NOTE — DISCHARGE SUMMARY
Mari Nguyen [address] [facility]       Pt Name: Lynn Rehman  MRN: [id number]  [place]: 1950  Primary Care Physician: Kingston Lundborg, MD    Admit date:  9/20/2020  5:22 AM      Discharge date:  9/22/2020  9:25 AM    Admitting Diagnosis:   1. Moderate to high grade partial small bowel obstruction    Discharge Diagnosis:   1. Resolved bowel obstruction  2. History of colostomy     Admitting Service: General Surgery, Carter Nielsen MD.    Consultants:  None    History and Physical:  Pt Name: Lynn Rehman  MRN: [id number]  YOB: 1950  Date of evaluation: 9/20/2020  Primary Care Physician: Kingston Lundborg, MD  Patient evaluated at the request of  [facility]  Reason for evaluation: SBO  IMPRESSIONS   1. CT evidence of moderate to high grade partial small bowel obstruction  2. Abdominal pain  3. History of colostomy, multiple abdominal surgeries  4.  has a past medical history of Blood transfusion, CAD (coronary artery disease), Diverticulitis, Hyperlipidemia, Hypertension, Kidney disease, S/P colostomy ([facility] [address] and Type II or unspecified type diabetes mellitus without mention of complication, not stated as uncontrolled. PLANS   1. Admit to med surg floor  2. Clear liquids  3. NPO and NG tube if develops nausea/vomiting  4. IV fluids   5. Serial abdominal exams   6. Repeat KUB in AM   7. Upload/review images from outside hospital  SUBJECTIVE   History of Chief Complaint:    Rianna German is a 65 y.o.male who presents as a transfer in from [facility] for surgical evaluation of small bowel obstruction which was found on CT abdomen pelvis early this morning. Patient is known to the [address] [place] [address] Surgery practice and Dr. Chula Frias.  In September 2011, the patient underwent open sigmoid resection by Dr. Gavin Hameed secondary to diverticular disease. Sandy Silva had a delayed leak.  Required exploration with diversion.  In THE MORNING FOR 90 DAYS 7/1/20   Yes Historical Provider, MD   glimepiride (AMARYL) 2 MG tablet Take 2 mg by mouth every morning (before breakfast) 2/17/20   Yes Historical Provider, MD   ramipril (ALTACE) 2.5 MG capsule Take 1 capsule by mouth once daily 4/23/20   Yes Johnnie Kang MD   Cholecalciferol (VITAMIN D3) 5000 units TABS Take 500 Units by mouth daily     Yes Historical Provider, MD   rosuvastatin (CRESTOR) 20 MG tablet Take 20 mg by mouth daily  7/25/18   Yes Historical Provider, MD   pioglitazone (ACTOS) 45 MG tablet Take 45 mg by mouth daily     Yes Historical Provider, MD   metoprolol succinate (TOPROL XL) 25 MG extended release tablet Take 25 mg by mouth daily     Yes Historical Provider, MD   ferrous sulfate 325 (65 FE) MG tablet Take 325 mg by mouth daily (with breakfast)     Yes Historical Provider, MD   [employer] VITAMIN B12 1000 MCG tablet TAKE 1 TABLET BY MOUTH EVERY DAY 11/4/15   Yes Johnnie Kang MD   Magnesium 500 MG TABS Take 500 mg by mouth 2 times daily      Yes Historical Provider, MD   Multiple Vitamins-Minerals (PX MENS MULTIVITAMINS PO) Take  by mouth. otc-with iron      Yes Historical Provider, MD   fenofibrate (TRICOR) 145 MG tablet Take 145 mg by mouth daily      Yes Historical Provider, MD   aspirin 325 MG tablet Take 325 mg by mouth daily. Stop 1 week preop     Yes Historical Provider, MD   Omega-3 Fatty Acids (FISH OIL) 1000 MG CAPS Take 1,000 mg by mouth 2 times daily.       Yes Historical Provider, MD   patiromer sorbitex calcium (VELTASSA) 8.4 g PACK packet Patient was given 5 packets for samples. He is to take a packet everyday for the next 3 days. Check potassium on 4th day and we will go from there.  7/13/20     Toan Pacheco MD   [facility] Natural Products (GLUCOSAMINE CHOND COMPLEX/MSM PO) Take 1 tablet by mouth 2 times daily       Historical Provider, MD       Scheduled Meds:  Scheduled Medications    sodium chloride flush  10 mL Intravenous 2 times per day    pantoprazole  40 mg Intravenous Daily     And    sodium chloride (PF)  10 mL Intravenous Daily        Continuous Infusions:  Infusions Meds    sodium chloride 125 mL/hr at 20 0655        PRN Meds:. PRN Medications   sodium chloride flush, morphine **OR** morphine, ondansetron, metoclopramide     Allergies  is allergic to allegra [fexofenadine hydrochloride] and flagyl [metronidazole]. Family History  family history includes Asthma in his brother; Cancer in his mother; Diabetes in his mother; Heart Disease in his brother and father; High Blood Pressure in his mother; High Cholesterol in his mother; Stroke in his mother. Social History   reports that he has never smoked. He has never used smokeless tobacco. He reports that he does not drink alcohol or use drugs. Review of Systems:  General Denies any fever or chills. HEENT Denies any diplopia, tinnitus or vertigo. Resp Denies any shortness of breath, cough or wheezing. Cardiac Denies any chest pain, palpitations, claudication or edema. GI Denies any melena, hematochezia, hematemesis or pyrosis.  Denies any frequency, urgency, hesitancy or incontinence. Heme Denies bruising or bleeding easily. Neuro Denies any focal motor or sensory deficits. OBJECTIVE   CURRENT VITALS:  height is 6' (1.829 m) and weight is 263 lb 8 oz (119.5 kg). His oral temperature is 98.4 °F (36.9 °C). His blood pressure is 123/60 and his pulse is 89. His respiration is 18 and oxygen saturation is 92%. Body mass index is 35.74 kg/m².   Temperature Range (24h):Temp: 98.4 °F (36.9 °C) Temp  Av.5 °F (36.9 °C)  Min: 98.4 °F (36.9 °C)  Max: 98.5 °F (36.9 °C)  BP Range (26K): Systolic (84MNG), KR , Min:123 , UAE:678     Diastolic (65IES), POT:31, Min:60, Max:68    Pulse Range (24h): Pulse  Av.5  Min: 89  Max: 94  Respiration Range (24h): Resp  Av  Min: 16  Max: 18  Current Pulse Ox (24h):  SpO2: 92 %  Pulse Ox Range (24h):  SpO2  Av %  Min: 92 %  Max: 94 %  Oxygen Amount and Delivery:    CONSTITUTIONAL: Alert and oriented times 3, no acute distress and cooperative to examination with proper mood and affect. SKIN: Skin color, texture, turgor normal. No rashes or lesions. LYMPH: No cervical nodes. HEENT: Head is normocephalic, atraumatic. EOMI, PERRL. NECK: Supple, symmetrical, trachea midline, no adenopathy, thyroid symmetric, not enlarged and no tenderness, skin normal.  CHEST/LUNGS: Chest symmetric with normal A/P diameter, normal respiratory rate and rhythm, lungs clear to auscultation without wheezes, rales or rhonchi. No accessory muscle use. CARDIOVASCULAR: Heart sounds are normal.  Regular rate and rhythm without murmur, gallop or rub. Normal S1 and S2. Carotid and femoral pulses 2+/4 and equal bilaterally. ABDOMEN: Obese abdomen. Softly distended abdomen. Colostomy present and functioning. Mid abdomen is tender to palpation. Tinkling bowel sounds LUQ. NEUROLOGIC: There are no focalizing motor or sensory deficits. CN II-XII are grossly intact. EXTREMITIES: no cyanosis and no clubbing. LABS   No results for input(s): WBC, HGB, HCT, PLT, NA, K, CL, CO2, BUN, CREATININE, MG, PHOS, CALCIUM, PTT, INR, AST, ALT, BILITOT, BILIDIR, AMYLASE, LIPASE, LDH, LACTA, NITRU, COLORU, BACTERIA in the last 72 hours.     Invalid input(s): PT, WBCU, RBCU, LEUKOCYTESUA  RADIOLOGY      XR ABDOMEN (KUB) (SINGLE AP VIEW)    (Results Pending)   CT INTERPRETATION OF OUTSIDE IMAGES    (Results Pending)     Thank you for the interesting evaluation. Further recommendations to follow.     Electronically signed by Opal Oleary PA-C on 9/20/2020 at 9:56 AM      Patient seen and examined independently by me early this AM. Above discussed and I agree with LEROY Mooney. See my additional comments below for updated orders and plan. Labs, cultures, and radiographs where available were reviewed. I discussed patient concerns with the patient's nurse and instructions were given. Please see our orders for the updated patient care plan. -Patient's ostomy starting to function. He denies any type of abdominal pain or tenderness this morning. Okay to hold on NG tube decompression. Serial abdominal examination. It does well throughout the day then okay with clear liquids. KUB in a.m.  A.m. labs. DVT prophylaxis. Upload outside imaging to radiology. Discussed with patient the pros and cons of surgical intervention versus conservative treatment plan. Patient in agreement to continue with conservative treatment plan as he is feeling much better and ostomy starting to function. Denies nausea or vomiting. If things worsen then possible exploration needed at that time.     Electronically signed by Stewart Marr MD on 9/20/20 at 8:58 PM EDT     Procedures/Diagnostic Tests:    CT ABDOMEN AND PELVIS WITHOUT CONTRAST ENHANCEMENT/interpretation of outside images:         CLINICAL INFORMATION: CT abdomen pelvis, SBO         COMPARISON: No prior study.         TECHNIQUE: Multiple axial 5 mm images of the abdomen, pelvis, and lung bases were obtained without the administration of oral or intravenous contrast material. These images were obtained at [facility] and are interpreted at the request of    the attending care provider. Computer generated sagittal and coronal images of the abdomen and pelvis were also reconstructed. ALL CT SCANS AT THIS FACILITY use dose modulation, iterative reconstruction, and/or weight-based dosing when appropriate to    reduce radiation dose to as low as reasonably achievable.              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 Mild fibrotic stranding both lung bases. Small hiatus hernia. .         Liver: Liver unremarkable. There appear to be several tiny gallstones in the contracted gallbladder.  The gallbladder otherwise unremarkable.         Pancreas, spleen and adrenal glands: Unremarkable         Kidneys: Karly Carrington is a solitary benign-appearing subcentimeter cyst right kidney and several benign-appearing cysts left kidney. No hydronephrosis. No renal calculi. Vascular clips are seen adjacent to left kidney and spleen, apparently from prior bowel    surgery.         Bowel/Peritoneum: There is an ostomy in the left mid abdomen with a parastomal hernia containing a few small bowel loops which do not appear to be incarcerated. . Similar appearance seen on prior study there are multiple diverticula in the colon no    evidence for diverticulitis. Moderate distention of multiple small bowel loops are seen consistent with relatively severe diffuse ileus versus mechanical bowel obstruction. No free air. No free fluid in the abdomen. No abnormally enlarged para-aortic    lymph nodes are seen.         Bones : No evidence for acute fracture or bone destruction. Mild degenerative changes both hips. Moderate spurring right sacroiliac joint. Metallic sternotomy sutures from prior surgery.         Pelvis: Urinary bladder unremarkable. No pelvic mass or fluid collection is seen. Bilateral inguinal hernias that contain only adipose tissue.                   Impression    1. Findings of relatively severe diffuse paralytic ileus versus mechanical bowel obstruction. A definite point of obstruction cannot be determined from this study. 2. Multiple diverticula. No evidence for diverticulitis. 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 No evidence for entrapment or incarceration of the herniated small bowel loops. 3. Multiple bilateral renal cysts. Cholelithiasis. Fibroemphysematous lungs.                   **This report has been created using voice recognition software.  It may contain minor errors which are inherent in voice recognition technology. **         Final report electronically signed by Dr. Ismael Montaño on 9/22/2020 8:52 AM      PROCEDURE: XR ABDOMEN (KUB) (SINGLE AP VIEW)         CLINICAL INFORMATION: Small bowel obstruction      TECHNIQUE: 3 AP supine abdominal radiographs         COMPARISON: None         FINDINGS: Evaluation is limited by patient's obese body habitus. Bowel gas pattern is nonobstructive. There is contrast from a prior study in the colon. Surgical clips are present in the left hemiabdomen. There fluid bolus in the pelvis. Surgical clips    are also noted in the right inguinal region. Degenerative changes in the thoracolumbar spine are poorly visualized.              Impression         Nonobstructive bowel gas pattern.         Final report electronically signed by Dr. Driss yDer on 9/21/2020 7:21 AM      Hospital Course: Na Solano is a 68-year old male patient who presents for abdominal pain from outside facility. CT scan demonstrated sbo obstruction. History of colostomy and multiple abdominal surgeries. He was admitted for conservative management with IV fluids, pain/nausea control, and bowel rest. He clinically improved within 48 hours and was able to regain bowel function without abdominal pain, nausea or vomiting. He tolerated soft diet without problems. Discharge home without any surgical intervention.      Discharge Condition: stable    Disposition: Home    Labs:  Lab Results   Component Value Date    WBC 3.5 (L) 09/21/2020    HGB 10.6 (L) 09/21/2020    HCT 33.7 (L) 09/21/2020    MCV 97.1 (H) 09/21/2020     09/21/2020     Lab Results   Component Value Date     09/21/2020    K 4.6 09/21/2020     09/21/2020    CO2 24 09/21/2020    BUN 20 09/21/2020    CREATININE 1.6 09/21/2020    GLUCOSE 102 09/21/2020    GLUCOSE 81 07/09/2020    CALCIUM 8.4 09/21/2020      Discharge Medications:        Medication List      CONTINUE taking these medications    aspirin 325 MG tablet     [employer] VITAMIN B12 1000 MCG tablet  Generic drug:  cyanocobalamin  TAKE 1 TABLET BY MOUTH EVERY DAY     ferrous sulfate 325 (65 Fe) MG tablet  Commonly known as:  IRON 325     fish oil 1000 MG Caps     glimepiride 2 MG tablet  Commonly known as:  AMARYL     GLUCOSAMINE CHOND COMPLEX/MSM PO     isosorbide mononitrate 60 MG extended release tablet  Commonly known as:  IMDUR     Magnesium 500 MG Tabs     metoprolol succinate 25 MG extended release tablet  Commonly known as:  TOPROL XL     pioglitazone 45 MG tablet  Commonly known as:  ACTOS     PX MENS MULTIVITAMINS PO     ramipril 2.5 MG capsule  Commonly known as:  ALTACE  Take 1 capsule by mouth once daily     rosuvastatin 20 MG tablet  Commonly known as:  CRESTOR     Tricor 145 MG tablet  Generic drug:  fenofibrate     Veltassa 8.4 g Pack packet  Generic drug:  patiromer sorbitex calcium  Patient was given 5 packets for samples. He is to take a packet everyday for the next 3 days. Check potassium on 4th day and we will go from there.      Vitamin D3 125 MCG (5000 UT) Tabs            Diet: Soft diet as tolerated    Activity: as tolerated     Follow-up:  in 7 days with Teodora Patel MD  Follow up: in 1 week with Dr. Melanie Trejo CNP   Electronincally signed 9/23/2020 at 8:11 AM    A total of 35 minutes was spent in preparing the patient for discharge with greater than 50% of the time involved with education, counseling and coordinating care

## 2020-10-07 ENCOUNTER — OFFICE VISIT (OUTPATIENT)
Dept: SURGERY | Age: 70
End: 2020-10-07
Payer: MEDICARE

## 2020-10-07 VITALS
WEIGHT: 263 LBS | TEMPERATURE: 96.8 F | HEIGHT: 72 IN | RESPIRATION RATE: 18 BRPM | HEART RATE: 74 BPM | OXYGEN SATURATION: 96 % | SYSTOLIC BLOOD PRESSURE: 110 MMHG | BODY MASS INDEX: 35.62 KG/M2 | DIASTOLIC BLOOD PRESSURE: 62 MMHG

## 2020-10-07 PROCEDURE — G8484 FLU IMMUNIZE NO ADMIN: HCPCS | Performed by: SURGERY

## 2020-10-07 PROCEDURE — 1123F ACP DISCUSS/DSCN MKR DOCD: CPT | Performed by: SURGERY

## 2020-10-07 PROCEDURE — 99213 OFFICE O/P EST LOW 20 MIN: CPT | Performed by: SURGERY

## 2020-10-07 PROCEDURE — G8427 DOCREV CUR MEDS BY ELIG CLIN: HCPCS | Performed by: SURGERY

## 2020-10-07 PROCEDURE — 3017F COLORECTAL CA SCREEN DOC REV: CPT | Performed by: SURGERY

## 2020-10-07 PROCEDURE — 1036F TOBACCO NON-USER: CPT | Performed by: SURGERY

## 2020-10-07 PROCEDURE — 1111F DSCHRG MED/CURRENT MED MERGE: CPT | Performed by: SURGERY

## 2020-10-07 PROCEDURE — 4040F PNEUMOC VAC/ADMIN/RCVD: CPT | Performed by: SURGERY

## 2020-10-07 PROCEDURE — G8417 CALC BMI ABV UP PARAM F/U: HCPCS | Performed by: SURGERY

## 2020-10-08 ASSESSMENT — ENCOUNTER SYMPTOMS
APNEA: 0
SHORTNESS OF BREATH: 0
EYE REDNESS: 0
SORE THROAT: 0
RECTAL PAIN: 0
TROUBLE SWALLOWING: 0
COLOR CHANGE: 0
RHINORRHEA: 0
EYE PAIN: 0
EYE ITCHING: 0
EYE DISCHARGE: 0
VOICE CHANGE: 0
CONSTIPATION: 0
FACIAL SWELLING: 0
WHEEZING: 0
ALLERGIC/IMMUNOLOGIC NEGATIVE: 1
ABDOMINAL DISTENTION: 0
COUGH: 0
BLOOD IN STOOL: 0
CHOKING: 0
ABDOMINAL PAIN: 0
PHOTOPHOBIA: 0
DIARRHEA: 0
BACK PAIN: 0
SINUS PRESSURE: 0
STRIDOR: 0
VOMITING: 0
CHEST TIGHTNESS: 0
ANAL BLEEDING: 0
NAUSEA: 0

## 2020-10-08 NOTE — PROGRESS NOTES
sores, hematuria, penile pain, penile swelling, scrotal swelling, testicular pain and urgency. Musculoskeletal: Negative for arthralgias, back pain, gait problem, joint swelling, myalgias, neck pain and neck stiffness. Skin: Negative for color change, pallor, rash and wound. Allergic/Immunologic: Negative. Neurological: Negative for dizziness, tremors, seizures, syncope, facial asymmetry, speech difficulty, weakness, light-headedness, numbness and headaches. Hematological: Negative for adenopathy. Does not bruise/bleed easily. Psychiatric/Behavioral: Negative for agitation, behavioral problems, confusion, decreased concentration, dysphoric mood, hallucinations, self-injury, sleep disturbance and suicidal ideas. The patient is not nervous/anxious and is not hyperactive.         Past Medical History:   Diagnosis Date    Blood transfusion     CAD (coronary artery disease)     sees Dr Godfrey Griffin Diverticulitis     Hyperkalemia     Hyperlipidemia     Hypertension     Kidney disease     insufficiency after surgery-sees Dr Abhi Linares S/P colostomy [facility])     Type II or unspecified type diabetes mellitus without mention of complication, not stated as uncontrolled        Past Surgical History:   Procedure Laterality Date    APPENDECTOMY  2011    CARDIAC CATHETERIZATION  Sept 2012    at [facility] Dr. Dangelo Rice TEST  2017   [address] [place] [address]  2011 x2    Sigmoid colon resection and closure of bladder diverticulm--Dr. Mayda Nolasco, Dr. Bailee Osman COLONOSCOPY  2007    COLONOSCOPY  10/5/2017    COLONOSCOPY DIAGNOSTIC/STOMA performed by [address], MD at [address]  2005    X 3 vessel    HERNIA REPAIR  10-30-12    xenograft mesh-Dr. Obed Morales    KNEE SURGERY      calcium deposit removed age 13    OTHER SURGICAL HISTORY  10-30-12 (Dr. Obed Morales)    bilateral abd wall component separation with para stomal hernia repair    PACEMAKER PLACEMENT      [facility] COLONOSCOPY FLX DX W/COLLJ SPEC WHEN PFRMD Left 10/5/2017    COLONOSCOPY DIAGNOSTIC OR SCREENING performed by Juanito Simmons MD at [facility]    [facility] OFFICE/OUTPT VISIT,PROCEDURE ONLY Left 10/10/2018    WIDER EXCISION MELANOMA OF LEFT LOWER PARIETAL WITH FLAP CLOSURE performed by Kelly Minor MD at [address] N/A 10/26/2017    RECURRENT INCISIONAL HERNIA REPAIR WITH MESH performed by Daljit Paez MD at [address] [facility]    Left    SKIN CANCER EXCISION      TONSILLECTOMY      age 22       Current Outpatient Medications   Medication Sig Dispense Refill    isosorbide mononitrate (IMDUR) 60 MG extended release tablet TAKE 1 TABLET BY MOUTH ONCE DAILY IN THE MORNING FOR 90 DAYS      glimepiride (AMARYL) 2 MG tablet Take 2 mg by mouth every morning (before breakfast)      ramipril (ALTACE) 2.5 MG capsule Take 1 capsule by mouth once daily 90 capsule 1    Cholecalciferol (VITAMIN D3) 5000 units TABS Take 500 Units by mouth daily      Misc Natural Products (GLUCOSAMINE CHOND COMPLEX/MSM PO) Take 1 tablet by mouth 2 times daily      rosuvastatin (CRESTOR) 20 MG tablet Take 20 mg by mouth daily       pioglitazone (ACTOS) 45 MG tablet Take 45 mg by mouth daily      metoprolol succinate (TOPROL XL) 25 MG extended release tablet Take 25 mg by mouth daily      ferrous sulfate 325 (65 FE) MG tablet Take 325 mg by mouth daily (with breakfast)      [employer]S VITAMIN B12 1000 MCG tablet TAKE 1 TABLET BY MOUTH EVERY DAY 90 tablet 3    Magnesium 500 MG TABS Take 500 mg by mouth 2 times daily       Multiple Vitamins-Minerals (PX MENS MULTIVITAMINS PO) Take  by mouth. otc-with iron       fenofibrate (TRICOR) 145 MG tablet Take 145 mg by mouth daily       aspirin 325 MG tablet Take 325 mg by mouth daily. Stop 1 week preop      Omega-3 Fatty Acids (FISH OIL) 1000 MG CAPS Take 1,000 mg by mouth 2 times daily.          No current facility-administered medications for this visit.         Allergies   Allergen Reactions    Allegra [Fexofenadine Hydrochloride]      dizziness    Flagyl [Metronidazole] Nausea And Vomiting       Family History   Problem Relation Age of Onset    Cancer Mother         pancreas    Diabetes Mother     High Blood Pressure Mother     High Cholesterol Mother     Stroke Mother     Heart Disease Father     Heart Disease Brother     Asthma Brother     Colon Cancer Brother     Other Maternal Grandmother         unsure of type    Lung Cancer Maternal Grandfather     No Known Problems Paternal Grandmother     Heart Attack Paternal Grandfather     No Known Problems Brother     Prostate Cancer Brother        Social History     Socioeconomic History    Marital status:      Spouse name: Not on file    Number of children: Not on file    Years of education: Not on file    Highest education level: Not on file   Occupational History    Occupation:    Social Needs    Financial resource strain: Not on file    Food insecurity     Worry: Not on file     Inability: Not on file   [employer] needs     Medical: Not on file     Non-medical: Not on file   Tobacco Use    Smoking status: Never Smoker    Smokeless tobacco: Never Used   Substance and Sexual Activity    Alcohol use: No    Drug use: No    Sexual activity: Not on file   Lifestyle    Physical activity     Days per week: Not on file     Minutes per session: Not on file    Stress: Not on file   Relationships    Social connections     Talks on phone: Not on file     Gets together: Not on file     Attends Baptism service: Not on file     Active member of club or organization: Not on file     Attends meetings of clubs or organizations: Not on file     Relationship status: Not on file    Intimate partner violence     Fear of current or ex partner: Not on file     Emotionally abused: Not on file     Physically abused: Not on file Forced sexual activity: Not on file   Other Topics Concern    Not on file   Social History Narrative    Not on file     Vitals:    10/07/20 1126   BP: 110/62   Pulse: 74   Resp: 18   Temp: 96.8 °F (36 °C)   SpO2: 96%     Body mass index is 35.67 kg/m². Objective:   Physical Exam  Vitals signs reviewed. Constitutional:       General: He is not in acute distress. Appearance: He is well-developed. He is not diaphoretic. HENT:      Head: Normocephalic and atraumatic. Right Ear: External ear normal.      Left Ear: External ear normal.      Nose: Nose normal.   Eyes:      General: No scleral icterus. Right eye: No discharge. Left eye: No discharge. Conjunctiva/sclera: Conjunctivae normal.   Neck:      Musculoskeletal: Normal range of motion and neck supple. Cardiovascular:      Rate and Rhythm: Normal rate and regular rhythm. Heart sounds: Normal heart sounds. Pulmonary:      Effort: Pulmonary effort is normal. No respiratory distress. Breath sounds: Normal breath sounds. No wheezing or rales. Chest:      Chest wall: No tenderness. Abdominal:      General: Bowel sounds are normal. There is no distension. Palpations: Abdomen is soft. There is no mass. Tenderness: There is no abdominal tenderness. There is no guarding or rebound. Hernia: A hernia is present. Musculoskeletal: Normal range of motion. General: No tenderness. Skin:     General: Skin is warm and dry. Coloration: Skin is not pale. Findings: No erythema or rash. Neurological:      Mental Status: He is alert and oriented to person, place, and time. Cranial Nerves: No cranial nerve deficit. Psychiatric:         Behavior: Behavior normal.         Thought Content:  Thought content normal.         Judgment: Judgment normal.       Lab Results   Component Value Date     09/21/2020    K 4.6 09/21/2020     09/21/2020    CO2 24 09/21/2020     Lab Results Component Value Date    CREATININE 1.6 (H) 09/21/2020     Lab Results   Component Value Date    WBC 3.5 (L) 09/21/2020    HGB 10.6 (L) 09/21/2020    HCT 33.7 (L) 09/21/2020    MCV 97.1 (H) 09/21/2020     09/21/2020     Lab Results   Component Value Date    ALT 15 09/21/2020    AST 25 09/21/2020    ALKPHOS 36 (L) 09/21/2020    BILITOT 0.5 09/21/2020     Imaging -   Narrative    CT ABDOMEN AND PELVIS WITHOUT CONTRAST ENHANCEMENT/interpretation of outside images:         CLINICAL INFORMATION: CT abdomen pelvis, SBO         COMPARISON: No prior study.         TECHNIQUE: Multiple axial 5 mm images of the abdomen, pelvis, and lung bases were obtained without the administration of oral or intravenous contrast material. These images were obtained at [facility] and are interpreted at the request of    the attending care provider. Computer generated sagittal and coronal images of the abdomen and pelvis were also reconstructed. ALL CT SCANS AT THIS FACILITY use dose modulation, iterative reconstruction, and/or weight-based dosing when appropriate to    reduce radiation dose to as low as reasonably achievable.              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 Mild fibrotic stranding both lung bases. Small hiatus hernia. .         Liver: Liver unremarkable. There appear to be several tiny gallstones in the contracted gallbladder. The gallbladder otherwise unremarkable.         Pancreas, spleen and adrenal glands: Unremarkable         Kidneys: Celia Espinozales is a solitary benign-appearing subcentimeter cyst right kidney and several benign-appearing cysts left kidney. No hydronephrosis. No renal calculi. Vascular clips are seen adjacent to left kidney and spleen, apparently from prior bowel    surgery.         Bowel/Peritoneum: There is an ostomy in the left mid abdomen with a parastomal hernia containing a few small bowel loops which do not appear to be incarcerated. . Similar appearance seen on prior study there are multiple diverticula in the colon no    evidence for diverticulitis. Moderate distention of multiple small bowel loops are seen consistent with relatively severe diffuse ileus versus mechanical bowel obstruction. No free air. No free fluid in the abdomen. No abnormally enlarged para-aortic    lymph nodes are seen.         Bones : No evidence for acute fracture or bone destruction. Mild degenerative changes both hips. Moderate spurring right sacroiliac joint. Metallic sternotomy sutures from prior surgery.         Pelvis: Urinary bladder unremarkable. No pelvic mass or fluid collection is seen. Bilateral inguinal hernias that contain only adipose tissue.                   Impression    1. Findings of relatively severe diffuse paralytic ileus versus mechanical bowel obstruction. A definite point of obstruction cannot be determined from this study. 2. Multiple diverticula. No evidence for diverticulitis. 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 No evidence for entrapment or incarceration of the herniated small bowel loops. 3. Multiple bilateral renal cysts. Cholelithiasis. Fibroemphysematous lungs.                   **This report has been created using voice recognition software.  It may contain minor errors which are inherent in voice recognition technology. **         Final report electronically signed by Dr. Kurt Henson on 9/22/2020 8:52 AM        Patient Active Problem List   Diagnosis    Diverticula of colon    Wound, open, abdominal wall, anterior    Incisional hernia    CKD (chronic kidney disease) stage 3, GFR 30-59 ml/min    Vitamin D deficiency    HTN (hypertension)    [facility], [facility]    CAD (coronary artery disease)    Iron deficiency anemia    Hypertriglyceridemia    Hyperkalemia    Type 2 diabetes mellitus with stage 3 chronic kidney disease, without long-term current use of insulin (HCC)    Anemia, chronic disease    Recurrent incisional hernia    Essential hypertension    SBO (small bowel obstruction) ([facility])     Assessment:      1. Resolution of partial small bowel obstruction  2. Reducible left of midline and parastomal abdominal wall hernia  3. Obesity (BMI 35)      Plan:      1. Patient appears to be back to his baseline. Abdomen benign. Ostomy functioning appropriately. Tolerating diet. No pain or tenderness. 2.  Continuing with conservative treatment plan. Observing hernias at this time. Patient states if they enlarge or becomes more symptomatic then he is okay to proceed with repair. He understands this will be another major operation given his past surgical history. 3.  Restrictions discussed with patient. All questions answered. 4.  Dietary modification/restrictions. Nutritional education occurred today. 5.  Follow-up as needed  6. Signs and symptoms reviewed with patient that would be concerning and need him to return to office for re-evaluation. Patient states He will call if He has questions or concerns. 7.  Follow-up with PCP and GI as directed.         Shmuel Ann MD

## 2020-10-19 RX ORDER — RAMIPRIL 2.5 MG/1
2.5 CAPSULE ORAL DAILY
Qty: 90 CAPSULE | Refills: 1 | Status: SHIPPED | OUTPATIENT
Start: 2020-10-19 | End: 2021-04-20

## 2021-01-08 LAB
ANION GAP SERPL CALCULATED.3IONS-SCNC: 5 MMOL/L (ref 4–12)
BUN BLDV-MCNC: 38 MG/DL (ref 7–20)
CALCIUM SERPL-MCNC: 8.9 MG/DL (ref 8.8–10.5)
CHLORIDE BLD-SCNC: 106 MEQ/L (ref 101–111)
CO2: 26 MEQ/L (ref 21–32)
CREAT SERPL-MCNC: 1.97 MG/DL (ref 0.6–1.3)
CREATININE CLEARANCE: 34
GLUCOSE: 112 MG/DL (ref 70–110)
PARATHYROID HORMONE INTACT: 19.6 U/ML (ref 12–88)
POTASSIUM SERPL-SCNC: 5 MEQ/L (ref 3.6–5)
SODIUM BLD-SCNC: 137 MEQ/L (ref 135–145)
VITAMIN D 25-HYDROXY: 47.7 NG/ML (ref 30–100)

## 2021-01-14 ENCOUNTER — OFFICE VISIT (OUTPATIENT)
Dept: NEPHROLOGY | Age: 71
End: 2021-01-14
Payer: MEDICARE

## 2021-01-14 VITALS
TEMPERATURE: 97.3 F | HEART RATE: 82 BPM | DIASTOLIC BLOOD PRESSURE: 66 MMHG | BODY MASS INDEX: 35.75 KG/M2 | OXYGEN SATURATION: 94 % | WEIGHT: 263.6 LBS | SYSTOLIC BLOOD PRESSURE: 122 MMHG

## 2021-01-14 DIAGNOSIS — N18.32 DIABETES MELLITUS DUE TO UNDERLYING CONDITION WITH STAGE 3B CHRONIC KIDNEY DISEASE, UNSPECIFIED WHETHER LONG TERM INSULIN USE (HCC): ICD-10-CM

## 2021-01-14 DIAGNOSIS — E08.22 DIABETES MELLITUS DUE TO UNDERLYING CONDITION WITH STAGE 3B CHRONIC KIDNEY DISEASE, UNSPECIFIED WHETHER LONG TERM INSULIN USE (HCC): ICD-10-CM

## 2021-01-14 DIAGNOSIS — I10 ESSENTIAL HYPERTENSION: ICD-10-CM

## 2021-01-14 DIAGNOSIS — N18.32 STAGE 3B CHRONIC KIDNEY DISEASE (HCC): Primary | ICD-10-CM

## 2021-01-14 DIAGNOSIS — E11.21 DIABETIC NEPHROPATHY ASSOCIATED WITH TYPE 2 DIABETES MELLITUS (HCC): ICD-10-CM

## 2021-01-14 DIAGNOSIS — D63.8 ANEMIA OF CHRONIC DISEASE: ICD-10-CM

## 2021-01-14 PROCEDURE — 3046F HEMOGLOBIN A1C LEVEL >9.0%: CPT | Performed by: INTERNAL MEDICINE

## 2021-01-14 PROCEDURE — 1123F ACP DISCUSS/DSCN MKR DOCD: CPT | Performed by: INTERNAL MEDICINE

## 2021-01-14 PROCEDURE — 1036F TOBACCO NON-USER: CPT | Performed by: INTERNAL MEDICINE

## 2021-01-14 PROCEDURE — G8417 CALC BMI ABV UP PARAM F/U: HCPCS | Performed by: INTERNAL MEDICINE

## 2021-01-14 PROCEDURE — G8427 DOCREV CUR MEDS BY ELIG CLIN: HCPCS | Performed by: INTERNAL MEDICINE

## 2021-01-14 PROCEDURE — 3017F COLORECTAL CA SCREEN DOC REV: CPT | Performed by: INTERNAL MEDICINE

## 2021-01-14 PROCEDURE — 4040F PNEUMOC VAC/ADMIN/RCVD: CPT | Performed by: INTERNAL MEDICINE

## 2021-01-14 PROCEDURE — G8482 FLU IMMUNIZE ORDER/ADMIN: HCPCS | Performed by: INTERNAL MEDICINE

## 2021-01-14 PROCEDURE — 2022F DILAT RTA XM EVC RTNOPTHY: CPT | Performed by: INTERNAL MEDICINE

## 2021-01-14 PROCEDURE — 99214 OFFICE O/P EST MOD 30 MIN: CPT | Performed by: INTERNAL MEDICINE

## 2021-01-14 NOTE — PROGRESS NOTES
Renal Progress Note    Assessment and Plan:      Diagnosis Orders   1. Stage 3b chronic kidney disease  Basic Metabolic Panel    Vitamin D 25 Hydroxy    PTH, Intact   2. Anemia of chronic disease     3. Essential hypertension     4. Diabetes mellitus due to underlying condition with stage 3b chronic kidney disease, unspecified whether long term insulin use ([facility]     5. Diabetic nephropathy associated with type 2 diabetes mellitus ([facility]        PLAN:  I discussed my thoughts with the patient at length. He understood. I addressed his questions. Lab result reviewed with him together in epic  Serum creatinine is stable at 1.97 g/L. PTH is normal.  Vitamin D level is normal.  Medications reviewed. No changes. Regarding the cardiac cath, okay to proceed if absolutely necessary.   However, I suggest pre and post procedure intravenous fluid hydration between at least 6 to 8 hours before and 6-8-hour post procedure  Discussed with the patient  Return return in 6 months with labs        Patient Active Problem List   Diagnosis    Diverticula of colon    Wound, open, abdominal wall, anterior    Incisional hernia    CKD (chronic kidney disease) stage 3, GFR 30-59 ml/min    Vitamin D deficiency    DM type 2 (diabetes mellitus, type 2) ([facility]    CAD (coronary artery disease)    Iron deficiency anemia    Hypertriglyceridemia    Hyperkalemia    Type 2 diabetes mellitus with stage 3 chronic kidney disease, without long-term current use of insulin (HCC)    Anemia, chronic disease    Recurrent incisional hernia    Essential hypertension    SBO (small bowel obstruction) ([facility])       Subjective:   Chief complaint:  Chief Complaint   Patient presents with    Chronic Kidney Disease     Stage IIIb HPI:This is a follow up visit for Mr. Lazaro Hillman who is here today for return appointment. I see him for chronic kidney disease. He was last seen about 6 months ago. Serum creatinine was 2.01 mg/L. Today it is  1.97 g/L. He did have SARS CoV- 2 infection about a month ago. He did well. No new medications. His cardiologist is contemplating  cardiac catheterization due to occasional chest pain modified by nitroglycerin. No shortness of breath. No nausea vomiting. No fever or chills. No headaches. No difficulties with urination. ROS: As in the history of present illness. Other systems are negative. Medications:     Current Outpatient Medications   Medication Sig Dispense Refill    ramipril (ALTACE) 2.5 MG capsule Take 1 capsule by mouth daily 90 capsule 1    isosorbide mononitrate (IMDUR) 60 MG extended release tablet TAKE 1 TABLET BY MOUTH ONCE DAILY IN THE MORNING FOR 90 DAYS      glimepiride (AMARYL) 2 MG tablet Take 2 mg by mouth every morning (before breakfast)      Cholecalciferol (VITAMIN D3) 5000 units TABS Take 500 Units by mouth daily      Misc Natural Products (GLUCOSAMINE CHOND COMPLEX/MSM PO) Take 1 tablet by mouth 2 times daily      rosuvastatin (CRESTOR) 20 MG tablet Take 20 mg by mouth daily       pioglitazone (ACTOS) 45 MG tablet Take 45 mg by mouth daily      metoprolol succinate (TOPROL XL) 25 MG extended release tablet Take 25 mg by mouth daily      ferrous sulfate 325 (65 FE) MG tablet Take 325 mg by mouth daily (with breakfast)      CVS VITAMIN B12 1000 MCG tablet TAKE 1 TABLET BY MOUTH EVERY DAY 90 tablet 3    Magnesium 500 MG TABS Take 500 mg by mouth 2 times daily       Multiple Vitamins-Minerals (PX MENS MULTIVITAMINS PO) Take  by mouth. otc-with iron       fenofibrate (TRICOR) 145 MG tablet Take 145 mg by mouth daily       aspirin 325 MG tablet Take 325 mg by mouth daily.  Stop 1 week preop  Omega-3 Fatty Acids (FISH OIL) 1000 MG CAPS Take 1,000 mg by mouth 2 times daily. No current facility-administered medications for this visit.         Lab Results:    CBC:   Lab Results   Component Value Date    WBC 3.5 (L) 09/21/2020    HGB 10.6 (L) 09/21/2020    HCT 33.7 (L) 09/21/2020    MCV 97.1 (H) 09/21/2020     09/21/2020     BMP:    Lab Results   Component Value Date     01/08/2021     09/21/2020     07/09/2020    K 5.0 01/08/2021    K 4.6 09/21/2020    K 5.0 07/22/2020     01/08/2021     09/21/2020     07/09/2020    CO2 26 01/08/2021    CO2 24 09/21/2020    CO2 24 07/09/2020    BUN 38 (H) 01/08/2021    BUN 20 09/21/2020    BUN 35 (H) 07/09/2020    CREATININE 1.97 (H) 01/08/2021    CREATININE 1.6 (H) 09/21/2020    CREATININE 2.04 (H) 07/09/2020    GLUCOSE 112 (H) 01/08/2021    GLUCOSE 102 09/21/2020    GLUCOSE 81 07/09/2020      Hepatic:   Lab Results   Component Value Date    AST 25 09/21/2020    AST 28 07/09/2020    AST 28 09/05/2019    ALT 15 09/21/2020    ALT 17 07/09/2020    ALT 19 09/05/2019    BILITOT 0.5 09/21/2020    BILITOT 0.5 07/09/2020    BILITOT 0.6 09/05/2019    ALKPHOS 36 (L) 09/21/2020    ALKPHOS 37 (L) 07/09/2020    ALKPHOS 38 (L) 09/05/2019     BNP: No results found for: BNP  Lipids:   Lab Results   Component Value Date    CHOL 123 07/09/2020    HDL 9 (L) 07/09/2020     INR:   Lab Results   Component Value Date    INR 1.02 10/30/2012     URINE: No results found for: NAUR, PROTUR  No results found for: NITRU, COLORU, PHUR, LABCAST, WBCUA, RBCUA, MUCUS, TRICHOMONAS, YEAST, BACTERIA, CLARITYU, SPECGRAV, LEUKOCYTESUR, UROBILINOGEN, BILIRUBINUR, BLOODU, GLUCOSEU, KETUA, AMORPHOUS   Microalbumen/Creatinine ratio:  No components found for: RUCREAT    Objective: Vitals: /66 (Site: Right Upper Arm, Position: Sitting, Cuff Size: Large Adult)   Pulse 82   Temp 97.3 °F (36.3 °C)   Wt 263 lb 9.6 oz (119.6 kg)   SpO2 94%   BMI 35.75 kg/m²      Constitutional:  Alert, awake, no apparent distress  Skin:normal with no rash or any lesions  HEENT:Pupils are reactive . Throat is clear. Oral mucosa is moist.  Neck:supple with no thyromegaly or bruit   Cardiovascular:  S1, S2 without murmur   Respiratory:  Clear to auscultation with no wheezes or rales  Abdomen: +bowel sound, soft, non tender and no bruit. Colostomy bag noted in the left lower quadrant. Ext: No LE edema  Musculoskeletal:Intact  Neuro:Alert, awake and oriented with no obvious focal deficit. Speech is normal.    Electronically signed by José Luis Saldana MD on 1/14/2021 at 1:29 PM   **This report has been created using voice recognition software. It maycontain minor  errors which are inherent in voice recognition technology. **

## 2021-04-20 RX ORDER — RAMIPRIL 2.5 MG/1
CAPSULE ORAL
Qty: 90 CAPSULE | Refills: 1 | Status: SHIPPED | OUTPATIENT
Start: 2021-04-20 | End: 2021-04-25 | Stop reason: SDUPTHER

## 2021-04-26 RX ORDER — RAMIPRIL 2.5 MG/1
2.5 CAPSULE ORAL DAILY
Qty: 90 CAPSULE | Refills: 3 | Status: SHIPPED | OUTPATIENT
Start: 2021-04-26 | End: 2022-08-04 | Stop reason: SDUPTHER

## 2021-07-12 LAB
ANION GAP SERPL CALCULATED.3IONS-SCNC: 7 MMOL/L (ref 4–12)
BUN BLDV-MCNC: 36 MG/DL (ref 7–20)
CALCIUM SERPL-MCNC: 9.4 MG/DL (ref 8.8–10.5)
CHLORIDE BLD-SCNC: 105 MEQ/L (ref 101–111)
CO2: 25 MEQ/L (ref 21–32)
CREAT SERPL-MCNC: 1.93 MG/DL (ref 0.6–1.3)
CREATININE CLEARANCE: 34
GLUCOSE: 94 MG/DL (ref 70–110)
PARATHYROID HORMONE INTACT: 14.2 U/ML (ref 12–88)
POTASSIUM SERPL-SCNC: 5.1 MEQ/L (ref 3.6–5)
SODIUM BLD-SCNC: 137 MEQ/L (ref 135–145)
VITAMIN D 25-HYDROXY: 35.5 NG/ML (ref 30–100)

## 2021-07-15 ENCOUNTER — OFFICE VISIT (OUTPATIENT)
Dept: NEPHROLOGY | Age: 71
End: 2021-07-15
Payer: MEDICARE

## 2021-07-15 VITALS
BODY MASS INDEX: 35.72 KG/M2 | OXYGEN SATURATION: 91 % | TEMPERATURE: 97.5 F | SYSTOLIC BLOOD PRESSURE: 122 MMHG | HEART RATE: 68 BPM | DIASTOLIC BLOOD PRESSURE: 71 MMHG | WEIGHT: 263.4 LBS

## 2021-07-15 DIAGNOSIS — E08.22 DIABETES MELLITUS DUE TO UNDERLYING CONDITION WITH STAGE 3B CHRONIC KIDNEY DISEASE, UNSPECIFIED WHETHER LONG TERM INSULIN USE (HCC): ICD-10-CM

## 2021-07-15 DIAGNOSIS — N18.32 DIABETES MELLITUS DUE TO UNDERLYING CONDITION WITH STAGE 3B CHRONIC KIDNEY DISEASE, UNSPECIFIED WHETHER LONG TERM INSULIN USE (HCC): ICD-10-CM

## 2021-07-15 DIAGNOSIS — E11.21 DIABETIC NEPHROPATHY ASSOCIATED WITH TYPE 2 DIABETES MELLITUS (HCC): ICD-10-CM

## 2021-07-15 DIAGNOSIS — N18.32 STAGE 3B CHRONIC KIDNEY DISEASE (HCC): Primary | ICD-10-CM

## 2021-07-15 DIAGNOSIS — D63.8 ANEMIA OF CHRONIC DISEASE: ICD-10-CM

## 2021-07-15 DIAGNOSIS — I10 ESSENTIAL HYPERTENSION: ICD-10-CM

## 2021-07-15 PROCEDURE — 3017F COLORECTAL CA SCREEN DOC REV: CPT | Performed by: INTERNAL MEDICINE

## 2021-07-15 PROCEDURE — 3046F HEMOGLOBIN A1C LEVEL >9.0%: CPT | Performed by: INTERNAL MEDICINE

## 2021-07-15 PROCEDURE — G8427 DOCREV CUR MEDS BY ELIG CLIN: HCPCS | Performed by: INTERNAL MEDICINE

## 2021-07-15 PROCEDURE — 1036F TOBACCO NON-USER: CPT | Performed by: INTERNAL MEDICINE

## 2021-07-15 PROCEDURE — 4040F PNEUMOC VAC/ADMIN/RCVD: CPT | Performed by: INTERNAL MEDICINE

## 2021-07-15 PROCEDURE — 99213 OFFICE O/P EST LOW 20 MIN: CPT | Performed by: INTERNAL MEDICINE

## 2021-07-15 PROCEDURE — G8417 CALC BMI ABV UP PARAM F/U: HCPCS | Performed by: INTERNAL MEDICINE

## 2021-07-15 PROCEDURE — 1123F ACP DISCUSS/DSCN MKR DOCD: CPT | Performed by: INTERNAL MEDICINE

## 2021-07-15 PROCEDURE — 2022F DILAT RTA XM EVC RTNOPTHY: CPT | Performed by: INTERNAL MEDICINE

## 2021-07-15 RX ORDER — FAMOTIDINE 10 MG
10 TABLET ORAL DAILY
COMMUNITY

## 2021-07-15 NOTE — PROGRESS NOTES
Renal Progress Note    Assessment and Plan:      Diagnosis Orders   1. Stage 3b chronic kidney disease ([facility]     2. Diabetes mellitus due to underlying condition with stage 3b chronic kidney disease, unspecified whether long term insulin use ([facility]     3. Essential hypertension     4. Anemia of chronic disease     5. Diabetic nephropathy associated with type 2 diabetes mellitus ([facility]       PLAN:  Discussed my thoughts with the patient. He understood. Lab result discussed with him. We went through the report together in epic  He had no questions  Serum creatinine is stable at 1.93 mg/dL. PTH is normal.  Vitamin D level is normal.   Serum potassium is borderline high at 5.1 mEq/L  Medications reviewed  No changes  Low potassium diet information provided to him  Return visit in 6 months with labs        Patient Active Problem List   Diagnosis    Diverticula of colon    Wound, open, abdominal wall, anterior    Incisional hernia    CKD (chronic kidney disease) stage 3, GFR 30-59 ml/min ([facility]    Vitamin D deficiency    DM type 2 (diabetes mellitus, type 2) ([facility]    CAD (coronary artery disease)    Iron deficiency anemia    Hypertriglyceridemia    Hyperkalemia    Type 2 diabetes mellitus with stage 3 chronic kidney disease, without long-term current use of insulin ([facility])    Anemia, chronic disease    Recurrent incisional hernia    Essential hypertension    SBO (small bowel obstruction) ([facility]       Subjective:   Chief complaint:  Chief Complaint   Patient presents with    Chronic Kidney Disease     Stage IIIb      HPI:This is a follow up visit for Mr. Kristina Kelly here today for return appointment. I see him for chronic kidney disease. Was last seen about 6 months ago. Nothing new since then except that his isosorbide has been increased from 60 mg a day to 120 mg a day due to chest discomfort by his cardiologist Dr. Agnes Gibbons. .  No more chest pain now. No shortness of breath. No nausea vomiting. K 4.6 09/21/2020     07/12/2021     01/08/2021     09/21/2020    CO2 25 07/12/2021    CO2 26 01/08/2021    CO2 24 09/21/2020    BUN 36 (H) 07/12/2021    BUN 38 (H) 01/08/2021    BUN 20 09/21/2020    CREATININE 1.93 (H) 07/12/2021    CREATININE 1.97 (H) 01/08/2021    CREATININE 1.6 (H) 09/21/2020    GLUCOSE 94 07/12/2021    GLUCOSE 112 (H) 01/08/2021    GLUCOSE 102 09/21/2020      Hepatic:   Lab Results   Component Value Date    AST 25 09/21/2020    AST 28 07/09/2020    AST 28 09/05/2019    ALT 15 09/21/2020    ALT 17 07/09/2020    ALT 19 09/05/2019    BILITOT 0.5 09/21/2020    BILITOT 0.5 07/09/2020    BILITOT 0.6 09/05/2019    ALKPHOS 36 (L) 09/21/2020    ALKPHOS 37 (L) 07/09/2020    ALKPHOS 38 (L) 09/05/2019     BNP: No results found for: BNP  Lipids:   Lab Results   Component Value Date    CHOL 123 07/09/2020    HDL 9 (L) 07/09/2020     INR:   Lab Results   Component Value Date    INR 1.02 10/30/2012     URINE: No results found for: NAUR, PROTUR  No results found for: NITRU, COLORU, PHUR, LABCAST, WBCUA, RBCUA, MUCUS, TRICHOMONAS, YEAST, BACTERIA, CLARITYU, SPECGRAV, LEUKOCYTESUR, UROBILINOGEN, BILIRUBINUR, BLOODU, GLUCOSEU, KETUA, AMORPHOUS   Microalbumen/Creatinine ratio:  No components found for: RUCREAT    Objective:   Vitals: /71 (Site: Left Upper Arm, Position: Sitting, Cuff Size: Large Adult)   Pulse 68   Temp 97.5 °F (36.4 °C)   Wt 263 lb 6.4 oz (119.5 kg)   SpO2 91%   BMI 35.72 kg/m²      Constitutional:  Alert, awake, no apparent distress  Skin:normal with no rash or any lesions  HEENT:Pupils are reactive . Throat is clear. Oral mucosa is moist.  Neck:supple with no thyromegaly or bruit   Cardiovascular:  S1, S2 without murmur   Respiratory:  Clear to auscultation with no wheezes or rales  Abdomen: +bowel sound, soft, non tender and no bruit  Ext: No LE edema  Musculoskeletal:Intact  Neuro:Alert, awake and oriented with no obvious focal deficit.   Speech is normal.    Electronically signed by Yemi Ramírez MD on 7/15/2021 at 1:44 PM   **This report has been created using voice recognition software. It maycontain minor  errors which are inherent in voice recognition technology. **    Psoriatic to J increased.   For leg pain and little bit of discomfort in hearing he decided to do 0 will follow cardiac integument: Well hydrated, no rash

## 2021-11-10 ENCOUNTER — TELEPHONE (OUTPATIENT)
Dept: SURGERY | Age: 71
End: 2021-11-10

## 2022-01-20 LAB
ANION GAP SERPL CALCULATED.3IONS-SCNC: 7 MMOL/L (ref 4–12)
BUN BLDV-MCNC: 29 MG/DL (ref 7–20)
CALCIUM SERPL-MCNC: 9.5 MG/DL (ref 8.8–10.5)
CHLORIDE BLD-SCNC: 103 MEQ/L (ref 101–111)
CO2: 26 MEQ/L (ref 21–32)
CREAT SERPL-MCNC: 1.95 MG/DL (ref 0.6–1.3)
CREATININE CLEARANCE: 34
GLUCOSE, FASTING: 124 MG/DL (ref 70–110)
PARATHYROID HORMONE INTACT: 17.6 U/ML (ref 12–88)
POTASSIUM SERPL-SCNC: 5.1 MEQ/L (ref 3.6–5)
SODIUM BLD-SCNC: 136 MEQ/L (ref 135–145)
VITAMIN D 25-HYDROXY: 48.3 NG/ML (ref 30–100)

## 2022-01-27 ENCOUNTER — OFFICE VISIT (OUTPATIENT)
Dept: NEPHROLOGY | Age: 72
End: 2022-01-27
Payer: MEDICARE

## 2022-01-27 VITALS
TEMPERATURE: 97 F | OXYGEN SATURATION: 97 % | DIASTOLIC BLOOD PRESSURE: 75 MMHG | WEIGHT: 261.8 LBS | HEART RATE: 73 BPM | BODY MASS INDEX: 35.51 KG/M2 | SYSTOLIC BLOOD PRESSURE: 134 MMHG

## 2022-01-27 DIAGNOSIS — E08.22 DIABETES MELLITUS DUE TO UNDERLYING CONDITION WITH STAGE 3B CHRONIC KIDNEY DISEASE, UNSPECIFIED WHETHER LONG TERM INSULIN USE (HCC): ICD-10-CM

## 2022-01-27 DIAGNOSIS — N18.32 STAGE 3B CHRONIC KIDNEY DISEASE (HCC): Primary | ICD-10-CM

## 2022-01-27 DIAGNOSIS — N18.32 DIABETES MELLITUS DUE TO UNDERLYING CONDITION WITH STAGE 3B CHRONIC KIDNEY DISEASE, UNSPECIFIED WHETHER LONG TERM INSULIN USE (HCC): ICD-10-CM

## 2022-01-27 DIAGNOSIS — D63.8 ANEMIA OF CHRONIC DISEASE: ICD-10-CM

## 2022-01-27 DIAGNOSIS — I10 ESSENTIAL HYPERTENSION: ICD-10-CM

## 2022-01-27 DIAGNOSIS — E11.21 DIABETIC NEPHROPATHY ASSOCIATED WITH TYPE 2 DIABETES MELLITUS (HCC): ICD-10-CM

## 2022-01-27 PROCEDURE — 1123F ACP DISCUSS/DSCN MKR DOCD: CPT | Performed by: INTERNAL MEDICINE

## 2022-01-27 PROCEDURE — 99213 OFFICE O/P EST LOW 20 MIN: CPT | Performed by: INTERNAL MEDICINE

## 2022-01-27 PROCEDURE — 3046F HEMOGLOBIN A1C LEVEL >9.0%: CPT | Performed by: INTERNAL MEDICINE

## 2022-01-27 PROCEDURE — G8417 CALC BMI ABV UP PARAM F/U: HCPCS | Performed by: INTERNAL MEDICINE

## 2022-01-27 PROCEDURE — 3017F COLORECTAL CA SCREEN DOC REV: CPT | Performed by: INTERNAL MEDICINE

## 2022-01-27 PROCEDURE — 1036F TOBACCO NON-USER: CPT | Performed by: INTERNAL MEDICINE

## 2022-01-27 PROCEDURE — 2022F DILAT RTA XM EVC RTNOPTHY: CPT | Performed by: INTERNAL MEDICINE

## 2022-01-27 PROCEDURE — G8482 FLU IMMUNIZE ORDER/ADMIN: HCPCS | Performed by: INTERNAL MEDICINE

## 2022-01-27 PROCEDURE — 4040F PNEUMOC VAC/ADMIN/RCVD: CPT | Performed by: INTERNAL MEDICINE

## 2022-01-27 PROCEDURE — G8427 DOCREV CUR MEDS BY ELIG CLIN: HCPCS | Performed by: INTERNAL MEDICINE

## 2022-01-27 NOTE — PROGRESS NOTES
Renal Progress Note    Assessment and Plan:       Diagnosis Orders   1. Stage 3b chronic kidney disease ([facility]     2. Diabetes mellitus due to underlying condition with stage 3b chronic kidney disease, unspecified whether long term insulin use ([facility] [address]     3. Anemia of chronic disease     4. Diabetic nephropathy associated with type 2 diabetes mellitus ([facility] [address]     5. Essential hypertension       PLAN:   Labs are discussed with the patient. He understood. I addressed his questions. We went through the report together in epic. Serum creatinine stable at 1.95 mg/dL. PTH is normal.  Vitamin D level is normal.  Medications reviewed   No changes   Return visit in 6 months with labs      Patient Active Problem List   Diagnosis    Diverticula of colon    CKD (chronic kidney disease) stage 3, GFR 30-59 ml/min ([facility]    Vitamin D deficiency    DM type 2 (diabetes mellitus, type 2) ([facility] [facility]    CAD (coronary artery disease)    Iron deficiency anemia    Hypertriglyceridemia    Type 2 diabetes mellitus with stage 3 chronic kidney disease, without long-term current use of insulin ([facility])    Anemia, chronic disease    Recurrent incisional hernia    Essential hypertension    SBO (small bowel obstruction) ([facility] [address]           Subjective:   Chief complaint:  Chief Complaint   Patient presents with    Chronic Kidney Disease     Stage IIIb      HPI:This is a follow up visit for Mr. Ly Alvarado here today for return appointment. I see him for chronic kidney disease. Was last seen about 6 months ago. Doing well since then. No issues of concern today. No chest pain. No shortness of breath. No nausea vomiting. No fever chills. No difficulties with urination. No hospitalizations since last time I saw him. ROS:  Pertinent positives stated above in HPI. All other systems were reviewed and were negative.   Medications:     Current Outpatient Medications   Medication Sig Dispense Refill    famotidine (PEPCID) 10 MG tablet Take 10 mg by mouth daily      ramipril (ALTACE) 2.5 MG capsule Take 1 capsule by mouth daily 90 capsule 3    isosorbide mononitrate (IMDUR) 60 MG extended release tablet Take 60 mg by mouth 2 times daily       glimepiride (AMARYL) 2 MG tablet Take 2 mg by mouth every morning (before breakfast)      Cholecalciferol (VITAMIN D3) 5000 units TABS Take 500 Units by mouth daily      rosuvastatin (CRESTOR) 20 MG tablet Take 20 mg by mouth daily       pioglitazone (ACTOS) 45 MG tablet Take 45 mg by mouth daily      metoprolol succinate (TOPROL XL) 25 MG extended release tablet Take 25 mg by mouth daily      ferrous sulfate 325 (65 FE) MG tablet Take 325 mg by mouth daily (with breakfast)      CVS VITAMIN B12 1000 MCG tablet TAKE 1 TABLET BY MOUTH EVERY DAY 90 tablet 3    Magnesium 500 MG TABS Take 500 mg by mouth 2 times daily       Multiple Vitamins-Minerals (PX MENS MULTIVITAMINS PO) Take  by mouth. otc-with iron       fenofibrate (TRICOR) 145 MG tablet Take 145 mg by mouth daily       aspirin 325 MG tablet Take 325 mg by mouth daily. Stop 1 week preop      Omega-3 Fatty Acids (FISH OIL) 1000 MG CAPS Take 1,000 mg by mouth 2 times daily. No current facility-administered medications for this visit.        Lab Results:    CBC:   Lab Results   Component Value Date    WBC 3.5 (L) 09/21/2020    HGB 10.6 (L) 09/21/2020    HCT 33.7 (L) 09/21/2020    MCV 97.1 (H) 09/21/2020     09/21/2020     BMP:    Lab Results   Component Value Date     01/20/2022     07/12/2021     01/08/2021    K 5.1 (H) 01/20/2022    K 5.1 (H) 07/12/2021    K 5.0 01/08/2021     01/20/2022     07/12/2021     01/08/2021    CO2 26 01/20/2022    CO2 25 07/12/2021    CO2 26 01/08/2021    BUN 29 (H) 01/20/2022    BUN 36 (H) 07/12/2021    BUN 38 (H) 01/08/2021    CREATININE 1.95 (H) 01/20/2022    CREATININE 1.93 (H) 07/12/2021    CREATININE 1.97 (H) 01/08/2021    GLUCOSE 94 07/12/2021    GLUCOSE 112 (H) 01/08/2021    GLUCOSE 102 09/21/2020      Hepatic:   Lab Results   Component Value Date    AST 25 09/21/2020    AST 28 07/09/2020    AST 28 09/05/2019    ALT 15 09/21/2020    ALT 17 07/09/2020    ALT 19 09/05/2019    BILITOT 0.5 09/21/2020    BILITOT 0.5 07/09/2020    BILITOT 0.6 09/05/2019    ALKPHOS 36 (L) 09/21/2020    ALKPHOS 37 (L) 07/09/2020    ALKPHOS 38 (L) 09/05/2019     BNP: No results found for: BNP  Lipids:   Lab Results   Component Value Date    CHOL 123 07/09/2020    HDL 9 (L) 07/09/2020     INR:   Lab Results   Component Value Date    INR 1.02 10/30/2012     URINE: No results found for: Haig Lemon  No results found for: NITRU, COLORU, PHUR, LABCAST, WBCUA, RBCUA, MUCUS, TRICHOMONAS, YEAST, BACTERIA, CLARITYU, SPECGRAV, LEUKOCYTESUR, UROBILINOGEN, BILIRUBINUR, BLOODU, GLUCOSEU, KETUA, AMORPHOUS   Microalbumen/Creatinine ratio:  No components found for: RUCREAT    Objective:   Vitals: /75 (Site: Left Upper Arm, Position: Sitting, Cuff Size: Large Adult)   Pulse 73   Temp 97 °F (36.1 °C)   Wt 261 lb 12.8 oz (118.8 kg)   SpO2 97%   BMI 35.51 kg/m²      Constitutional:  Alert, awake, no apparent distress  Skin:normal with no rash or any significant lesions  HEENT:Pupils are reactive . Throat is clear. Oral mucosa is moist.  Neck:supple with no thyromegaly, JVD, lymphadenopathy or bruit   Cardiovascular: Regular sinus rhythm without murmur, rubs or gallops   Respiratory:  Clear to auscultation with no wheezes or rales  Abdomen: Good bowel sound, soft, non tender and no bruit  Ext: No LE edema  Musculoskeletal:Intact  Neuro:Alert, awake and oriented with no obvious focal deficit. Speech is normal.    Electronically signed by Adry Bella MD on 1/27/2022 at 12:59 PM   **This report has been created using voice recognition software. It maycontain minor  errors which are inherent in voice recognition technology. **

## 2022-03-09 ENCOUNTER — OFFICE VISIT (OUTPATIENT)
Dept: SURGERY | Age: 72
End: 2022-03-09
Payer: MEDICARE

## 2022-03-09 VITALS
HEIGHT: 72 IN | DIASTOLIC BLOOD PRESSURE: 60 MMHG | OXYGEN SATURATION: 96 % | WEIGHT: 262.2 LBS | SYSTOLIC BLOOD PRESSURE: 120 MMHG | TEMPERATURE: 96.7 F | BODY MASS INDEX: 35.51 KG/M2 | HEART RATE: 54 BPM | RESPIRATION RATE: 18 BRPM

## 2022-03-09 DIAGNOSIS — K63.9 LESION OF COLON: Primary | ICD-10-CM

## 2022-03-09 PROCEDURE — 3017F COLORECTAL CA SCREEN DOC REV: CPT | Performed by: SURGERY

## 2022-03-09 PROCEDURE — G8427 DOCREV CUR MEDS BY ELIG CLIN: HCPCS | Performed by: SURGERY

## 2022-03-09 PROCEDURE — 1123F ACP DISCUSS/DSCN MKR DOCD: CPT | Performed by: SURGERY

## 2022-03-09 PROCEDURE — 1036F TOBACCO NON-USER: CPT | Performed by: SURGERY

## 2022-03-09 PROCEDURE — G8482 FLU IMMUNIZE ORDER/ADMIN: HCPCS | Performed by: SURGERY

## 2022-03-09 PROCEDURE — 99213 OFFICE O/P EST LOW 20 MIN: CPT | Performed by: SURGERY

## 2022-03-09 PROCEDURE — G8417 CALC BMI ABV UP PARAM F/U: HCPCS | Performed by: SURGERY

## 2022-03-09 PROCEDURE — 4040F PNEUMOC VAC/ADMIN/RCVD: CPT | Performed by: SURGERY

## 2022-03-09 ASSESSMENT — ENCOUNTER SYMPTOMS
SINUS PRESSURE: 0
VOICE CHANGE: 0
NAUSEA: 0
ANAL BLEEDING: 0
BLOOD IN STOOL: 0
CHEST TIGHTNESS: 0
COUGH: 0
TROUBLE SWALLOWING: 0
ABDOMINAL PAIN: 0
COLOR CHANGE: 0
VOMITING: 0
FACIAL SWELLING: 0
CONSTIPATION: 0
DIARRHEA: 0
SORE THROAT: 0
RHINORRHEA: 0
BACK PAIN: 0
ALLERGIC/IMMUNOLOGIC NEGATIVE: 1
WHEEZING: 0
PHOTOPHOBIA: 0
SHORTNESS OF BREATH: 0
CHOKING: 0
EYE ITCHING: 0
STRIDOR: 0
ABDOMINAL DISTENTION: 0
EYE PAIN: 0
EYE REDNESS: 0
RECTAL PAIN: 0
APNEA: 0
EYE DISCHARGE: 0

## 2022-03-09 NOTE — PROGRESS NOTES
Aleja Strickland (:  1950)     ASSESSMENT:  1. Colostomy lesions  2. Reducible left of midline and parastomal hernia  3. Diabetes mellitus    PLAN:  1. Schedule Rico C for excision colostomy lesions in procedure room. 2. The risks, benefits and alternatives were discussed with Ameya JOHNSON including non-operative management. All questions answered. He understands and wishes to proceed with surgical intervention. 3. Restrictions discussed with Will Range and he expresses understanding. 4. He is advised to call back directly if there are further questions/concerns, or if his symptoms worsen prior to surgery. 5.  Observation of reducible hernia at this time. No change since last visit. Patient does not want to proceed with surgery at this time. Ostomy functioning well. SUBJECTIVE/OBJECTIVE:    Chief Complaint   Patient presents with    Surgical Consult     Est patient-last seen in the office in 10/7/2020-Lesion around stoma     HPI  Ameya Arrington is a 73-year-old male who presents for new lesion around his colostomy. Originally underwent sigmoid resection due to diverticular disease. Had a leak requiring take back to surgery and colostomy formation. History of incisional and parastomal hernia repair in  as well. At that time he underwent bilateral abdominal wall myocutaneous advancement flaps. Also underwent repair of recurrent incisional hernia x3 in 2017. Has a known left of the midline incisional hernia as well as a parastomal hernia. These have not changed since last visit. Only complaint is several small friable lesions around the outer edge of his ostomy and then 1 larger lesion about 1 cm in diameter on the lateral aspect. They will ooze/bleed because of friability. Have increased in number and size. Denies any abdominal pain. No bloating or distention. Tolerating diet. Ostomy otherwise functioning really well. No chest pain or shortness of breath. No fever, chills or sweats.   No urinary complaints. Overall otherwise doing well. Review of Systems   Constitutional: Negative for activity change, appetite change, chills, diaphoresis, fatigue, fever and unexpected weight change. HENT: Negative for congestion, dental problem, drooling, ear discharge, ear pain, facial swelling, hearing loss, mouth sores, nosebleeds, postnasal drip, rhinorrhea, sinus pressure, sneezing, sore throat, tinnitus, trouble swallowing and voice change. Eyes: Negative for photophobia, pain, discharge, redness, itching and visual disturbance. Respiratory: Negative for apnea, cough, choking, chest tightness, shortness of breath, wheezing and stridor. Cardiovascular: Negative for chest pain, palpitations and leg swelling. Gastrointestinal: Negative for abdominal distention, abdominal pain, anal bleeding, blood in stool, constipation, diarrhea, nausea, rectal pain and vomiting. Endocrine: Negative. Genitourinary: Negative for decreased urine volume, difficulty urinating, dysuria, enuresis, flank pain, frequency, genital sores, hematuria, penile discharge, penile pain, penile swelling, scrotal swelling, testicular pain and urgency. Musculoskeletal: Negative for arthralgias, back pain, gait problem, joint swelling, myalgias, neck pain and neck stiffness. Skin: Negative for color change, pallor, rash and wound. Allergic/Immunologic: Negative. Neurological: Negative for dizziness, tremors, seizures, syncope, facial asymmetry, speech difficulty, weakness, light-headedness, numbness and headaches. Hematological: Negative for adenopathy. Does not bruise/bleed easily. Psychiatric/Behavioral: Negative for agitation, behavioral problems, confusion, decreased concentration, dysphoric mood, hallucinations, self-injury, sleep disturbance and suicidal ideas. The patient is not nervous/anxious and is not hyperactive.         Past Medical History:   Diagnosis Date    Blood transfusion     CAD (coronary artery disease)     sees Dr Margie Villa    Diverticulitis     Hyperkalemia     Hyperlipidemia     Hypertension     Kidney disease     insufficiency after surgery-sees Dr Kacie Womack S/P colostomy [facility])     Type II or unspecified type diabetes mellitus without mention of complication, not stated as uncontrolled        Past Surgical History:   Procedure Laterality Date    APPENDECTOMY  2011    CARDIAC CATHETERIZATION  Sept 2012    at [facility] Dr. Reyes Maha TEST  2017   [address]  2011 x2    Sigmoid colon resection and closure of bladder diverticulm--Dr. Shayy Thomason, Dr. Ratna Rocha COLONOSCOPY  2007    COLONOSCOPY  10/5/2017    COLONOSCOPY DIAGNOSTIC/STOMA performed by Jennifer Pringle MD at [address]  2005    X 3 vessel    HERNIA REPAIR  10-30-12    xenograft mesh-Dr. Jason Atkinson    KNEE SURGERY      calcium deposit removed age 13    OTHER SURGICAL HISTORY  10-30-12 (Dr. Jason Atkinson)    bilateral abd wall component separation with para stomal hernia repair    PACEMAKER PLACEMENT      PA COLONOSCOPY FLX DX W/COLLJ SPEC WHEN PFRMD Left 10/5/2017    COLONOSCOPY DIAGNOSTIC OR SCREENING performed by Jennifer Pringle MD at [facility]    PA OFFICE/OUTPT VISIT,PROCEDURE ONLY Left 10/10/2018    WIDER EXCISION MELANOMA OF LEFT LOWER PARIETAL WITH FLAP CLOSURE performed by Soto Kincaid MD at [address] [facility] N/A 10/26/2017    RECURRENT INCISIONAL HERNIA REPAIR WITH MESH performed by Shayla Wright MD at [address] [facility]    Left    SKIN CANCER EXCISION      TONSILLECTOMY      age 22       Current Outpatient Medications   Medication Sig Dispense Refill    famotidine (PEPCID) 10 MG tablet Take 10 mg by mouth daily      ramipril (ALTACE) 2.5 MG capsule Take 1 capsule by mouth daily 90 capsule 3    isosorbide mononitrate (IMDUR) 60 MG extended release tablet Take 60 mg by mouth 2 times daily       glimepiride (AMARYL) 2 MG tablet Take 2 mg by mouth every morning (before breakfast)      Cholecalciferol (VITAMIN D3) 5000 units TABS Take 500 Units by mouth daily      rosuvastatin (CRESTOR) 20 MG tablet Take 20 mg by mouth daily       pioglitazone (ACTOS) 45 MG tablet Take 45 mg by mouth daily      metoprolol succinate (TOPROL XL) 25 MG extended release tablet Take 25 mg by mouth daily      ferrous sulfate 325 (65 FE) MG tablet Take 325 mg by mouth daily (with breakfast)      CVS VITAMIN B12 1000 MCG tablet TAKE 1 TABLET BY MOUTH EVERY DAY 90 tablet 3    Magnesium 500 MG TABS Take 500 mg by mouth 2 times daily       Multiple Vitamins-Minerals (PX MENS MULTIVITAMINS PO) Take  by mouth. otc-with iron       fenofibrate (TRICOR) 145 MG tablet Take 145 mg by mouth daily       aspirin 325 MG tablet Take 325 mg by mouth daily. Stop 1 week preop      Omega-3 Fatty Acids (FISH OIL) 1000 MG CAPS Take 1,000 mg by mouth 2 times daily. No current facility-administered medications for this visit.        Allergies   Allergen Reactions    Allegra [Fexofenadine Hydrochloride]      dizziness    Flagyl [Metronidazole] Nausea And Vomiting       Family History   Problem Relation Age of Onset    Cancer Mother         pancreas    Diabetes Mother     High Blood Pressure Mother     High Cholesterol Mother     Stroke Mother     Heart Disease Father     Heart Disease Brother     Asthma Brother     Colon Cancer Brother     Other Maternal Grandmother         unsure of type    Lung Cancer Maternal Grandfather     No Known Problems Paternal Grandmother     Heart Attack Paternal Grandfather     No Known Problems Brother     Prostate Cancer Brother        Social History     Socioeconomic History    Marital status:      Spouse name: Not on file    Number of children: Not on file    Years of education: Not on file    Highest education level: Not on file   Occupational History  Occupation:    Tobacco Use    Smoking status: Never Smoker    Smokeless tobacco: Never Used   Vaping Use    Vaping Use: Never used   Substance and Sexual Activity    Alcohol use: No    Drug use: No    Sexual activity: Not on file   Other Topics Concern    Not on file   Social History Narrative    Not on file     Social Determinants of Health     Financial Resource Strain:     Difficulty of Paying Living Expenses: Not on file   Food Insecurity:     Worried About Running Out of Food in the Last Year: Not on file    Andi of Food in the Last Year: Not on file   Transportation Needs:     Lack of Transportation (Medical): Not on file    Lack of Transportation (Non-Medical): Not on file   Physical Activity:     Days of Exercise per Week: Not on file    Minutes of Exercise per Session: Not on file   Stress:     Feeling of Stress : Not on file   Social Connections:     Frequency of Communication with Friends and Family: Not on file    Frequency of Social Gatherings with Friends and Family: Not on file    Attends Nondenominational Services: Not on file    Active Member of Clubs or Organizations: Not on file    Attends Club or Organization Meetings: Not on file    Marital Status: Not on file   Intimate Partner Violence:     Fear of Current or Ex-Partner: Not on file    Emotionally Abused: Not on file    Physically Abused: Not on file    Sexually Abused: Not on file   Housing Stability:     Unable to Pay for Housing in the Last Year: Not on file    Number of [place] in the Last Year: Not on file    Unstable Housing in the Last Year: Not on file     Vitals:    03/09/22 1036   BP: 120/60   Site: Right Upper Arm   Position: Sitting   Cuff Size: Medium Adult   Pulse: 54   Resp: 18   Temp: 96.7 °F (35.9 °C)   TempSrc: Temporal   SpO2: 96%   Weight: 262 lb 3.2 oz (118.9 kg)   Height: 6' (1.829 m)     Body mass index is 35.56 kg/m².     Wt Readings from Last 3 Encounters:   03/09/22 262 lb 3.2 oz (118.9 kg)   01/27/22 261 lb 12.8 oz (118.8 kg)   07/15/21 263 lb 6.4 oz (119.5 kg)     Physical Exam  Vitals reviewed. Constitutional:       General: He is not in acute distress. Appearance: He is well-developed. He is not diaphoretic. HENT:      Head: Normocephalic and atraumatic. Right Ear: External ear normal.      Left Ear: External ear normal.      Nose: Nose normal.   Eyes:      General: No scleral icterus. Right eye: No discharge. Left eye: No discharge. Conjunctiva/sclera: Conjunctivae normal.   Cardiovascular:      Rate and Rhythm: Normal rate and regular rhythm. Heart sounds: Normal heart sounds. Pulmonary:      Effort: Pulmonary effort is normal. No respiratory distress. Breath sounds: Normal breath sounds. No wheezing or rales. Chest:      Chest wall: No tenderness. Abdominal:      General: Bowel sounds are normal. There is no distension. Palpations: Abdomen is soft. There is no mass. Tenderness: There is no abdominal tenderness. There is no guarding or rebound. Hernia: A hernia is present. Musculoskeletal:         General: No tenderness. Normal range of motion. Cervical back: Normal range of motion and neck supple. Skin:     General: Skin is warm and dry. Coloration: Skin is not pale. Findings: No erythema or rash. Neurological:      Mental Status: He is alert and oriented to person, place, and time. Cranial Nerves: No cranial nerve deficit. Psychiatric:         Behavior: Behavior normal.         Thought Content:  Thought content normal.         Judgment: Judgment normal.       Lab Results   Component Value Date    WBC 3.5 (L) 09/21/2020    HGB 10.6 (L) 09/21/2020    HCT 33.7 (L) 09/21/2020    MCV 97.1 (H) 09/21/2020     09/21/2020     Lab Results   Component Value Date     01/20/2022    K 5.1 (H) 01/20/2022     01/20/2022    CO2 26 01/20/2022     Lab Results   Component Value Date    CREATININE 1.95 (H) 01/20/2022     Lab Results   Component Value Date    ALT 15 09/21/2020    AST 25 09/21/2020    ALKPHOS 36 (L) 09/21/2020    BILITOT 0.5 09/21/2020     No results found for: LIPASE    Patient Active Problem List   Diagnosis    Diverticula of colon    CKD (chronic kidney disease) stage 3, GFR 30-59 ml/min [facility]    Vitamin D deficiency    DM type 2 (diabetes mellitus, type 2) [facility] [address]    CAD (coronary artery disease)    Iron deficiency anemia    Hypertriglyceridemia    Type 2 diabetes mellitus with stage 3 chronic kidney disease, without long-term current use of insulin ([facility]    Anemia, chronic disease    Recurrent incisional hernia    Essential hypertension    SBO (small bowel obstruction) ([facility] [address]       An electronic signature was used to authenticate this note.     --Cheyenne Fournier MD

## 2022-03-14 ENCOUNTER — PROCEDURE VISIT (OUTPATIENT)
Dept: SURGERY | Age: 72
End: 2022-03-14
Payer: MEDICARE

## 2022-03-14 VITALS
WEIGHT: 262 LBS | DIASTOLIC BLOOD PRESSURE: 62 MMHG | RESPIRATION RATE: 18 BRPM | TEMPERATURE: 97.8 F | HEIGHT: 72 IN | HEART RATE: 64 BPM | OXYGEN SATURATION: 98 % | SYSTOLIC BLOOD PRESSURE: 128 MMHG | BODY MASS INDEX: 35.49 KG/M2

## 2022-03-14 DIAGNOSIS — K63.9 LESION OF COLON: Primary | ICD-10-CM

## 2022-03-14 PROCEDURE — 17110 DESTRUCTION B9 LES UP TO 14: CPT | Performed by: SURGERY

## 2022-03-14 PROCEDURE — 11401 EXC TR-EXT B9+MARG 0.6-1 CM: CPT | Performed by: SURGERY

## 2022-03-14 ASSESSMENT — ENCOUNTER SYMPTOMS
CHOKING: 0
SHORTNESS OF BREATH: 0
VOICE CHANGE: 0
ANAL BLEEDING: 0
COUGH: 0
PHOTOPHOBIA: 0
EYE DISCHARGE: 0
APNEA: 0
RHINORRHEA: 0
ABDOMINAL DISTENTION: 0
EYE PAIN: 0
ALLERGIC/IMMUNOLOGIC NEGATIVE: 1
VOMITING: 0
CHEST TIGHTNESS: 0
TROUBLE SWALLOWING: 0
EYE ITCHING: 0
COLOR CHANGE: 0
SORE THROAT: 0
BACK PAIN: 0
ABDOMINAL PAIN: 0
NAUSEA: 0
DIARRHEA: 0
BLOOD IN STOOL: 0
STRIDOR: 0
EYE REDNESS: 0
SINUS PRESSURE: 0
WHEEZING: 0
CONSTIPATION: 0
FACIAL SWELLING: 0
RECTAL PAIN: 0

## 2022-03-14 NOTE — PROGRESS NOTES
Stephen Luz (:  1950)     ASSESSMENT:  1. Colostomy lesions  2. Reducible left of midline and parastomal hernia  3. Diabetes mellitus    PLAN:  1. Large colostomy lesion, diverticular-like lesion in the middle of the ostomy and the small peripheral lesions excised/removed and cauterized in procedure room. See procedure note below for details. 2.  Wound/ostomy care as directed  3. Await final pathology results  4. Restrictions discussed  5. Follow-up in 10-14 days  6. Signs and symptoms reviewed with patient that would be concerning and need him to return to office for re-evaluation. Patient states He will call if He has questions or concerns. SUBJECTIVE/OBJECTIVE:    Chief Complaint   Patient presents with    Procedure     excision of lesions around ostomy x2     LIA Bray is a 68-year-old male who presents for excision of a large 1 cm lesion off the periphery of his colostomy at about 5:00. Also cauterizing multiple smaller lesions at the edge of the colostomy. He would also like to proceed with excision of a diverticular-like lesion in the middle of his ostomy. Has really not had any change since seen earlier in the month in the office. Denies any chest pain or shortness of breath. No fever, chills or sweats. No abdominal pain. Tolerating diet. Normal ostomy function. Otherwise, he feels well. Review of Systems   Constitutional: Negative for activity change, appetite change, chills, diaphoresis, fatigue, fever and unexpected weight change. HENT: Negative for congestion, dental problem, drooling, ear discharge, ear pain, facial swelling, hearing loss, mouth sores, nosebleeds, postnasal drip, rhinorrhea, sinus pressure, sneezing, sore throat, tinnitus, trouble swallowing and voice change. Eyes: Negative for photophobia, pain, discharge, redness, itching and visual disturbance.    Respiratory: Negative for apnea, cough, choking, chest tightness, shortness of breath, wheezing and stridor. Cardiovascular: Negative for chest pain, palpitations and leg swelling. Gastrointestinal: Negative for abdominal distention, abdominal pain, anal bleeding, blood in stool, constipation, diarrhea, nausea, rectal pain and vomiting. Endocrine: Negative. Genitourinary: Negative for decreased urine volume, difficulty urinating, dysuria, enuresis, flank pain, frequency, genital sores, hematuria, penile discharge, penile pain, penile swelling, scrotal swelling, testicular pain and urgency. Musculoskeletal: Negative for arthralgias, back pain, gait problem, joint swelling, myalgias, neck pain and neck stiffness. Skin: Negative for color change, pallor, rash and wound. Allergic/Immunologic: Negative. Neurological: Negative for dizziness, tremors, seizures, syncope, facial asymmetry, speech difficulty, weakness, light-headedness, numbness and headaches. Hematological: Negative for adenopathy. Does not bruise/bleed easily. Psychiatric/Behavioral: Negative for agitation, behavioral problems, confusion, decreased concentration, dysphoric mood, hallucinations, self-injury, sleep disturbance and suicidal ideas. The patient is not nervous/anxious and is not hyperactive.         Past Medical History:   Diagnosis Date    Blood transfusion     CAD (coronary artery disease)     sees Dr Helen Fang Diverticulitis     Hyperkalemia     Hyperlipidemia     Hypertension     Kidney disease     insufficiency after surgery-sees Dr Cassidy Emmanuel S/P colostomy [facility])     Type II or unspecified type diabetes mellitus without mention of complication, not stated as uncontrolled        Past Surgical History:   Procedure Laterality Date    APPENDECTOMY      CARDIAC CATHETERIZATION  2012    at [facility] Dr. Dg Pierce TEST     [address] [place] [address]  2011 x2    Sigmoid colon resection and closure of bladder diverticulm--Dr. Rosa Ruth, Dr. Helen Calvert      COLONOSCOPY  10/5/2017    COLONOSCOPY DIAGNOSTIC/STOMA performed by Roxane Mcclain MD at [address]  2005    X 3 vessel    HERNIA REPAIR  10-30-12    xenograft mesh-Dr. Donna Cerna    KNEE SURGERY      calcium deposit removed age 10    OTHER SURGICAL HISTORY  10-30-12 (Dr. Donna Cerna)    bilateral abd wall component separation with para stomal hernia repair    PACEMAKER PLACEMENT      MI COLONOSCOPY FLX DX W/COLLJ SPEC WHEN PFRMD Left 10/5/2017    COLONOSCOPY DIAGNOSTIC OR SCREENING performed by Roxane Mcclain MD at [address] [facility]    MI OFFICE/OUTPT VISIT,PROCEDURE ONLY Left 10/10/2018    WIDER EXCISION MELANOMA OF LEFT LOWER PARIETAL WITH FLAP CLOSURE performed by Eric Monge MD at [address] [facility] N/A 10/26/2017    RECURRENT INCISIONAL HERNIA REPAIR WITH MESH performed by Ave Gould MD at [address] [facility]    Left    SKIN CANCER EXCISION      TONSILLECTOMY      age 22       Current Outpatient Medications   Medication Sig Dispense Refill    famotidine (PEPCID) 10 MG tablet Take 10 mg by mouth daily      ramipril (ALTACE) 2.5 MG capsule Take 1 capsule by mouth daily 90 capsule 3    isosorbide mononitrate (IMDUR) 60 MG extended release tablet Take 60 mg by mouth 2 times daily       glimepiride (AMARYL) 2 MG tablet Take 2 mg by mouth every morning (before breakfast)      Cholecalciferol (VITAMIN D3) 5000 units TABS Take 500 Units by mouth daily      rosuvastatin (CRESTOR) 20 MG tablet Take 20 mg by mouth daily       pioglitazone (ACTOS) 45 MG tablet Take 45 mg by mouth daily      metoprolol succinate (TOPROL XL) 25 MG extended release tablet Take 25 mg by mouth daily      ferrous sulfate 325 (65 FE) MG tablet Take 325 mg by mouth daily (with breakfast)      [employer]S VITAMIN B12 1000 MCG tablet TAKE 1 TABLET BY MOUTH EVERY DAY 90 tablet 3    Magnesium 500 MG TABS Take 500 mg by mouth 2 times daily       Multiple Vitamins-Minerals (PX MENS MULTIVITAMINS PO) Take  by mouth. otc-with iron       fenofibrate (TRICOR) 145 MG tablet Take 145 mg by mouth daily       aspirin 325 MG tablet Take 325 mg by mouth daily. Stop 1 week preop      Omega-3 Fatty Acids (FISH OIL) 1000 MG CAPS Take 1,000 mg by mouth 2 times daily. No current facility-administered medications for this visit.        Allergies   Allergen Reactions    Allegra [Fexofenadine Hydrochloride]      dizziness    Flagyl [Metronidazole] Nausea And Vomiting       Family History   Problem Relation Age of Onset    Cancer Mother         pancreas    Diabetes Mother     High Blood Pressure Mother     High Cholesterol Mother     Stroke Mother     Heart Disease Father     Heart Disease Brother     Asthma Brother     Colon Cancer Brother     Other Maternal Grandmother         unsure of type    Lung Cancer Maternal Grandfather     No Known Problems Paternal Grandmother     Heart Attack Paternal Grandfather     No Known Problems Brother     Prostate Cancer Brother        Social History     Socioeconomic History    Marital status:      Spouse name: Not on file    Number of children: Not on file    Years of education: Not on file    Highest education level: Not on file   Occupational History    Occupation:    Tobacco Use    Smoking status: Never Smoker    Smokeless tobacco: Never Used   Vaping Use    Vaping Use: Never used   Substance and Sexual Activity    Alcohol use: No    Drug use: No    Sexual activity: Not on file   Other Topics Concern    Not on file   Social History Narrative    Not on file     Social Determinants of Health     Financial Resource Strain:     Difficulty of Paying Living Expenses: Not on file   Food Insecurity:     Worried About [address] in the Last Year: Not on file    [address] in the Last Year: Not on file   Transportation Needs:     Lack of Transportation (Medical): Not on file    Lack of Transportation (Non-Medical): Not on file   Physical Activity:     Days of Exercise per Week: Not on file    Minutes of Exercise per Session: Not on file   Stress:     Feeling of Stress : Not on file   Social Connections:     Frequency of Communication with Friends and Family: Not on file    Frequency of Social Gatherings with Friends and Family: Not on file    Attends Taoist Services: Not on file    Active Member of Clubs or Organizations: Not on file    Attends Club or Organization Meetings: Not on file    Marital Status: Not on file   Intimate Partner Violence:     Fear of Current or Ex-Partner: Not on file    Emotionally Abused: Not on file    Physically Abused: Not on file    Sexually Abused: Not on file   Housing Stability:     Unable to Pay for Housing in the Last Year: Not on file    Number of [place] in the Last Year: Not on file    Unstable Housing in the Last Year: Not on file     Vitals:    03/14/22 1219   BP: 128/62   Site: Left Upper Arm   Position: Sitting   Cuff Size: Medium Adult   Pulse: 64   Resp: 18   Temp: 97.8 °F (36.6 °C)   TempSrc: Temporal   SpO2: 98%   Weight: 262 lb (118.8 kg)   Height: 6' (1.829 m)     Body mass index is 35.53 kg/m². Wt Readings from Last 3 Encounters:   03/14/22 262 lb (118.8 kg)   03/09/22 262 lb 3.2 oz (118.9 kg)   01/27/22 261 lb 12.8 oz (118.8 kg)     Physical Exam  Vitals reviewed. Constitutional:       General: He is not in acute distress. Appearance: He is well-developed. He is not diaphoretic. HENT:      Head: Normocephalic and atraumatic. Right Ear: External ear normal.      Left Ear: External ear normal.      Nose: Nose normal.   Eyes:      General: No scleral icterus. Right eye: No discharge. Left eye: No discharge. Conjunctiva/sclera: Conjunctivae normal.   Cardiovascular:      Rate and Rhythm: Normal rate and regular rhythm.       Heart sounds: Normal heart sounds. Pulmonary:      Effort: Pulmonary effort is normal. No respiratory distress. Breath sounds: Normal breath sounds. No wheezing or rales. Chest:      Chest wall: No tenderness. Abdominal:      General: Bowel sounds are normal. There is no distension. Palpations: Abdomen is soft. There is no mass. Tenderness: There is no abdominal tenderness. There is no guarding or rebound. Musculoskeletal:         General: No tenderness. Normal range of motion. Cervical back: Normal range of motion and neck supple. Skin:     General: Skin is warm and dry. Coloration: Skin is not pale. Findings: No erythema or rash. Neurological:      Mental Status: He is alert and oriented to person, place, and time. Cranial Nerves: No cranial nerve deficit. Psychiatric:         Behavior: Behavior normal.         Thought Content:  Thought content normal.         Judgment: Judgment normal.       Lab Results   Component Value Date    WBC 3.5 (L) 09/21/2020    HGB 10.6 (L) 09/21/2020    HCT 33.7 (L) 09/21/2020    MCV 97.1 (H) 09/21/2020     09/21/2020     Lab Results   Component Value Date     01/20/2022    K 5.1 (H) 01/20/2022     01/20/2022    CO2 26 01/20/2022     Lab Results   Component Value Date    CREATININE 1.95 (H) 01/20/2022     Lab Results   Component Value Date    ALT 15 09/21/2020    AST 25 09/21/2020    ALKPHOS 36 (L) 09/21/2020    BILITOT 0.5 09/21/2020     No results found for: LIPASE    Patient Active Problem List   Diagnosis    Diverticula of colon    CKD (chronic kidney disease) stage 3, GFR 30-59 ml/min ([facility])    Vitamin D deficiency    DM type 2 (diabetes mellitus, type 2) ([facility] [address]    CAD (coronary artery disease)    Iron deficiency anemia    Hypertriglyceridemia    Type 2 diabetes mellitus with stage 3 chronic kidney disease, without long-term current use of insulin ([facility])    Anemia, chronic disease    Recurrent incisional hernia    Essential hypertension    SBO (small bowel obstruction) ([facility])     Procedure Note:    Preoperative Diagnosis: Colostomy lesions  Postoperative Diagnosis: Same  Operation:   1. Excision 1 cm lesion periphery of colostomy at 5 o'clock position  2. Cauterized 14 lesions periphery of colostomy (all less than 0.5 cm)  3. Excision 1 cm diverticular-like lesion center of colostomy  Surgeon:  Dr. Roel Luna  Anesthesia: Local -lidocaine with epinephrine and sodium bicarb  Complications: none  Indication of Procedure: As above  Procedure: Ama Davis was taken back to the procedure room. Consent obtained. Timeout occurred. He wished to proceed with the planned procedure. All questions answered. Placed supine on procedure room table. Tolerate local anesthetic well.  5:00 lesion at the periphery of the colostomy was excised with a 15 blade scalpel. About 1 cm in diameter. Sent the pathology for permanent. Hemostasis with electrocautery. 14 other lesions at the periphery of the colostomy all the way around were cauterized. All of these lesions were less than 0.5 cm in diameter. Finally, 1 cm lesion at the middle of the colostomy that appeared to be diverticular like was excised with electrocautery. Sent the pathology for permanent. Mucosa reapproximated with interrupted 3-0 Vicryl suture. Hemostasis looked good. No other abnormalities identified. Less than 5 cc blood loss. Patient tolerated procedure well with no apparent complications. Left procedure room in stable condition. An electronic signature was used to authenticate this note.     --Bhargav Negron MD

## 2022-03-31 ENCOUNTER — OFFICE VISIT (OUTPATIENT)
Dept: SURGERY | Age: 72
End: 2022-03-31

## 2022-03-31 VITALS
RESPIRATION RATE: 18 BRPM | BODY MASS INDEX: 35.49 KG/M2 | DIASTOLIC BLOOD PRESSURE: 68 MMHG | OXYGEN SATURATION: 93 % | SYSTOLIC BLOOD PRESSURE: 138 MMHG | HEART RATE: 83 BPM | TEMPERATURE: 96.2 F | HEIGHT: 72 IN | WEIGHT: 262 LBS

## 2022-03-31 DIAGNOSIS — Z51.89 VISIT FOR WOUND CHECK: Primary | ICD-10-CM

## 2022-03-31 PROCEDURE — 99024 POSTOP FOLLOW-UP VISIT: CPT | Performed by: SURGERY

## 2022-04-01 NOTE — PROGRESS NOTES
Patient here for wound check around ostomy-all areas healed. Dr Abby Davis into see patient and path reviewed with patient.

## 2022-07-22 LAB
ANION GAP SERPL CALCULATED.3IONS-SCNC: 6 MMOL/L (ref 4–12)
BUN BLDV-MCNC: 38 MG/DL (ref 7–20)
CALCIUM SERPL-MCNC: 9.1 MG/DL (ref 8.8–10.5)
CHLORIDE BLD-SCNC: 106 MEQ/L (ref 101–111)
CO2: 23 MEQ/L (ref 21–32)
CREAT SERPL-MCNC: 2.07 MG/DL (ref 0.6–1.3)
CREATININE CLEARANCE: 32
GLUCOSE: 113 MG/DL (ref 70–110)
PARATHYROID HORMONE INTACT: 21.7 U/ML (ref 12–88)
POTASSIUM SERPL-SCNC: 4.5 MEQ/L (ref 3.6–5)
SODIUM BLD-SCNC: 135 MEQ/L (ref 135–145)
VITAMIN D 25-HYDROXY: 41.7 NG/ML (ref 30–100)

## 2022-07-28 ENCOUNTER — OFFICE VISIT (OUTPATIENT)
Dept: NEPHROLOGY | Age: 72
End: 2022-07-28
Payer: MEDICARE

## 2022-07-28 VITALS
OXYGEN SATURATION: 93 % | SYSTOLIC BLOOD PRESSURE: 118 MMHG | WEIGHT: 263 LBS | HEART RATE: 70 BPM | DIASTOLIC BLOOD PRESSURE: 67 MMHG | BODY MASS INDEX: 35.67 KG/M2

## 2022-07-28 DIAGNOSIS — I10 ESSENTIAL HYPERTENSION: ICD-10-CM

## 2022-07-28 DIAGNOSIS — N18.32 STAGE 3B CHRONIC KIDNEY DISEASE (HCC): Primary | ICD-10-CM

## 2022-07-28 DIAGNOSIS — D63.8 ANEMIA OF CHRONIC DISEASE: ICD-10-CM

## 2022-07-28 DIAGNOSIS — E11.21 DIABETIC NEPHROPATHY ASSOCIATED WITH TYPE 2 DIABETES MELLITUS (HCC): ICD-10-CM

## 2022-07-28 PROCEDURE — G8417 CALC BMI ABV UP PARAM F/U: HCPCS | Performed by: INTERNAL MEDICINE

## 2022-07-28 PROCEDURE — 2022F DILAT RTA XM EVC RTNOPTHY: CPT | Performed by: INTERNAL MEDICINE

## 2022-07-28 PROCEDURE — 3046F HEMOGLOBIN A1C LEVEL >9.0%: CPT | Performed by: INTERNAL MEDICINE

## 2022-07-28 PROCEDURE — 3017F COLORECTAL CA SCREEN DOC REV: CPT | Performed by: INTERNAL MEDICINE

## 2022-07-28 PROCEDURE — 1123F ACP DISCUSS/DSCN MKR DOCD: CPT | Performed by: INTERNAL MEDICINE

## 2022-07-28 PROCEDURE — 99213 OFFICE O/P EST LOW 20 MIN: CPT | Performed by: INTERNAL MEDICINE

## 2022-07-28 PROCEDURE — 1036F TOBACCO NON-USER: CPT | Performed by: INTERNAL MEDICINE

## 2022-07-28 PROCEDURE — G8428 CUR MEDS NOT DOCUMENT: HCPCS | Performed by: INTERNAL MEDICINE

## 2022-07-28 NOTE — PROGRESS NOTES
Renal Progress Note    Assessment and Plan:      Diagnosis Orders   1. Stage 3b chronic kidney disease ([facility] [address]        2. Anemia of chronic disease        3. Diabetic nephropathy associated with type 2 diabetes mellitus ([facility] [address]        4. Essential hypertension                  PLAN:  Lab results are reviewed with the patient. He understood. I addressed his questions. Serum creatinine is mostly stable at 2.0 mg/L. PTH is normal.  Vitamin D level is normal.  Medications reviewed  No changes  Continue to avoid any nonsteroidal anti-inflammatory drugs  Low-protein admission was reported to him  He may benefit from  SGLT2 inhibitors but at the discretion of his primary care provider. Return visit in 6 months with labs to include  urine protein creatinine ratio. Patient Active Problem List   Diagnosis    Diverticula of colon    CKD (chronic kidney disease) stage 3, GFR 30-59 ml/min ([facility])    Vitamin D deficiency    DM type 2 (diabetes mellitus, type 2) ([facility] [address]    CAD (coronary artery disease)    Iron deficiency anemia    Hypertriglyceridemia    Type 2 diabetes mellitus with stage 3 chronic kidney disease, without long-term current use of insulin ([facility])    Anemia, chronic disease    Recurrent incisional hernia    Essential hypertension    SBO (small bowel obstruction) ([facility]           Subjective:   Chief complaint:  Chief Complaint   Patient presents with    Chronic Kidney Disease     Stage IIIb      HPI:This is a follow up visit for Mr. Katey Clay here today for return appointment. I see him for chronic kidney disease. He was last seen about 6 months ago. Doing well since then with no new complaint. No new medications since I saw him. No chest pain or shortness of breath. No nausea or vomiting. No fever or chills. No headaches. No difficulties with urination. He sleeps through the night without getting up to urinate. ROS:  Pertinent positives stated above in HPI.  All other systems were reviewed and were negative. Medications:     Current Outpatient Medications   Medication Sig Dispense Refill    famotidine (PEPCID) 10 MG tablet Take 10 mg by mouth daily      ramipril (ALTACE) 2.5 MG capsule Take 1 capsule by mouth daily 90 capsule 3    isosorbide mononitrate (IMDUR) 60 MG extended release tablet Take 60 mg by mouth 2 times daily       glimepiride (AMARYL) 2 MG tablet Take 2 mg by mouth every morning (before breakfast)      Cholecalciferol (VITAMIN D3) 5000 units TABS Take 500 Units by mouth daily      rosuvastatin (CRESTOR) 20 MG tablet Take 20 mg by mouth daily       pioglitazone (ACTOS) 45 MG tablet Take 45 mg by mouth daily      metoprolol succinate (TOPROL XL) 25 MG extended release tablet Take 25 mg by mouth daily      ferrous sulfate 325 (65 FE) MG tablet Take 325 mg by mouth daily (with breakfast)      CVS VITAMIN B12 1000 MCG tablet TAKE 1 TABLET BY MOUTH EVERY DAY 90 tablet 3    Magnesium 500 MG TABS Take 500 mg by mouth 2 times daily       Multiple Vitamins-Minerals (PX MENS MULTIVITAMINS PO) Take  by mouth. otc-with iron       fenofibrate (TRICOR) 145 MG tablet Take 145 mg by mouth daily       aspirin 325 MG tablet Take 325 mg by mouth daily. Stop 1 week preop      Omega-3 Fatty Acids (FISH OIL) 1000 MG CAPS Take 1,000 mg by mouth 2 times daily. No current facility-administered medications for this visit.        Lab Results:    CBC:   Lab Results   Component Value Date    WBC 3.5 (L) 09/21/2020    HGB 10.6 (L) 09/21/2020    HCT 33.7 (L) 09/21/2020    MCV 97.1 (H) 09/21/2020     09/21/2020     BMP:    Lab Results   Component Value Date     07/22/2022     01/20/2022     07/12/2021    K 4.5 07/22/2022    K 5.1 (H) 01/20/2022    K 5.1 (H) 07/12/2021     07/22/2022     01/20/2022     07/12/2021    CO2 23 07/22/2022    CO2 26 01/20/2022    CO2 25 07/12/2021    BUN 38 (H) 07/22/2022    BUN 29 (H) 01/20/2022    BUN 36 (H) 07/12/2021    CREATININE 2.07 (H) inherent in voice recognition technology. **

## 2022-08-05 RX ORDER — RAMIPRIL 2.5 MG/1
2.5 CAPSULE ORAL DAILY
Qty: 90 CAPSULE | Refills: 3 | Status: SHIPPED | OUTPATIENT
Start: 2022-08-05

## 2022-08-05 RX ORDER — RAMIPRIL 2.5 MG/1
CAPSULE ORAL
Qty: 90 CAPSULE | Refills: 0 | OUTPATIENT
Start: 2022-08-05

## 2023-01-18 LAB
ANION GAP SERPL CALCULATED.3IONS-SCNC: 6 MMOL/L (ref 4–12)
BUN BLDV-MCNC: 35 MG/DL (ref 7–20)
CALCIUM SERPL-MCNC: 8.9 MG/DL (ref 8.8–10.5)
CHLORIDE BLD-SCNC: 108 MEQ/L (ref 101–111)
CO2: 24 MEQ/L (ref 21–32)
CREAT SERPL-MCNC: 2.15 MG/DL (ref 0.6–1.3)
CREATININE CLEARANCE: 30
CREATININE, RANDOM URINE: 102.6 MG/DL
GLUCOSE, FASTING: 65 MG/DL (ref 70–110)
PARATHYROID HORMONE INTACT: 25.7 U/ML (ref 12–88)
POTASSIUM SERPL-SCNC: 4.8 MEQ/L (ref 3.6–5)
PROTEIN, URINE, RANDOM: < 4 MG/DL
PROTEIN/CREAT RATIO: < 0.04 G/1.73M2
SODIUM BLD-SCNC: 138 MEQ/L (ref 135–145)
VITAMIN D 25-HYDROXY: 38 NG/ML (ref 30–100)

## 2023-01-26 ENCOUNTER — OFFICE VISIT (OUTPATIENT)
Dept: NEPHROLOGY | Age: 73
End: 2023-01-26

## 2023-01-26 VITALS
SYSTOLIC BLOOD PRESSURE: 118 MMHG | OXYGEN SATURATION: 98 % | HEIGHT: 72 IN | BODY MASS INDEX: 36.03 KG/M2 | DIASTOLIC BLOOD PRESSURE: 66 MMHG | WEIGHT: 266 LBS | HEART RATE: 72 BPM

## 2023-01-26 DIAGNOSIS — E11.21 DIABETIC NEPHROPATHY ASSOCIATED WITH TYPE 2 DIABETES MELLITUS (HCC): ICD-10-CM

## 2023-01-26 DIAGNOSIS — N18.32 STAGE 3B CHRONIC KIDNEY DISEASE (HCC): Primary | ICD-10-CM

## 2023-01-26 DIAGNOSIS — I10 PRIMARY HYPERTENSION: ICD-10-CM

## 2023-01-26 DIAGNOSIS — E78.5 DYSLIPIDEMIA: ICD-10-CM

## 2023-01-26 RX ORDER — ROSUVASTATIN CALCIUM 40 MG/1
40 TABLET, COATED ORAL DAILY
COMMUNITY
Start: 2022-11-16 | End: 2023-01-26 | Stop reason: DRUGHIGH

## 2023-01-26 NOTE — PROGRESS NOTES
Renal Progress Note    Assessment and Plan:      Diagnosis Orders   1. Stage 3b chronic kidney disease (HCC)  Basic Metabolic Panel    Vitamin D 25 Hydroxy    PTH, Intact      2. Primary hypertension        3. Diabetic nephropathy associated with type 2 diabetes mellitus ([facility] [address]        4. Dyslipidemia                  PLAN:  Lab results reviewed with the patient together in epic  He understood  We had no questions  Serum creatinine is slightly increased to 2.15 mg/dL from 2.07 mg/dL in July 2022. PTH is normal  Vitamin D level is normal  Urine protein creatinine ratio is very good  Medications reviewed  Will like to add Dapagliflozin 5 mg a day as recommended by renal literature to minimize progression of chronic kidney disease but reluctant to do so since  kidney function has remained stable and also patient may be worried about pill burden as we said if it is not broken do not fix it  Will continue oral vitamin D supplementation  This was discussed with the patient. Patient Active Problem List   Diagnosis    Diverticula of colon    CKD (chronic kidney disease) stage 3, GFR 30-59 ml/min ([facility])    Vitamin D deficiency    DM type 2 (diabetes mellitus, type 2) ([facility] [address]    CAD (coronary artery disease)    Iron deficiency anemia    Hypertriglyceridemia    Type 2 diabetes mellitus with stage 3 chronic kidney disease, without long-term current use of insulin ([facility])    Anemia, chronic disease    Recurrent incisional hernia    Essential hypertension    SBO (small bowel obstruction) ([facility] [address]           Subjective:   Chief complaint:  Chief Complaint   Patient presents with    Chronic Kidney Disease     iiib      HPI:This is a follow up visit for Mr. Freda Stephens who is here today for return appointment. I see him for chronic kidney disease. He was last seen in July 2022. Doing well since then. No complaint. Appetite is good. No difficulty with urination. ROS:  Pertinent positives stated above in HPI.  All other systems were reviewed and were negative. Medications:     Current Outpatient Medications   Medication Sig Dispense Refill    ramipril (ALTACE) 2.5 MG capsule Take 1 capsule by mouth in the morning. 90 capsule 3    famotidine (PEPCID) 10 MG tablet Take 10 mg by mouth daily      isosorbide mononitrate (IMDUR) 60 MG extended release tablet Take 90 mg by mouth 2 times daily      glimepiride (AMARYL) 2 MG tablet Take 2 mg by mouth every morning (before breakfast)      Cholecalciferol (VITAMIN D3) 5000 units TABS Take 500 Units by mouth daily      rosuvastatin (CRESTOR) 20 MG tablet Take 20 mg by mouth daily       pioglitazone (ACTOS) 45 MG tablet Take 45 mg by mouth daily      metoprolol succinate (TOPROL XL) 25 MG extended release tablet Take 25 mg by mouth daily      ferrous sulfate 325 (65 FE) MG tablet Take 325 mg by mouth daily (with breakfast)      CVS VITAMIN B12 1000 MCG tablet TAKE 1 TABLET BY MOUTH EVERY DAY 90 tablet 3    Magnesium 400 MG TABS Take 500 mg by mouth 2 times daily       Multiple Vitamins-Minerals (PX MENS MULTIVITAMINS PO) Take  by mouth. otc-with iron       fenofibrate (TRICOR) 145 MG tablet Take 145 mg by mouth daily       aspirin 325 MG tablet Take 325 mg by mouth daily. Stop 1 week preop      Omega-3 Fatty Acids (FISH OIL) 1000 MG CAPS Take 1,000 mg by mouth 2 times daily. No current facility-administered medications for this visit.        Lab Results:    CBC:   Lab Results   Component Value Date    WBC 3.5 (L) 09/21/2020    HGB 10.6 (L) 09/21/2020    HCT 33.7 (L) 09/21/2020    MCV 97.1 (H) 09/21/2020     09/21/2020     BMP:    Lab Results   Component Value Date     01/18/2023     07/22/2022     01/20/2022    K 4.8 01/18/2023    K 4.5 07/22/2022    K 5.1 (H) 01/20/2022     01/18/2023     07/22/2022     01/20/2022    CO2 24 01/18/2023    CO2 23 07/22/2022    CO2 26 01/20/2022    BUN 35 (H) 01/18/2023    BUN 38 (H) 07/22/2022    BUN 29 (H) 01/20/2022    CREATININE 2.15 (H) 01/18/2023    CREATININE 2.07 (H) 07/22/2022    CREATININE 1.95 (H) 01/20/2022    GLUCOSE 113 (H) 07/22/2022    GLUCOSE 94 07/12/2021    GLUCOSE 112 (H) 01/08/2021      Hepatic:   Lab Results   Component Value Date    AST 25 09/21/2020    AST 28 07/09/2020    AST 28 09/05/2019    ALT 15 09/21/2020    ALT 17 07/09/2020    ALT 19 09/05/2019    BILITOT 0.5 09/21/2020    BILITOT 0.5 07/09/2020    BILITOT 0.6 09/05/2019    ALKPHOS 36 (L) 09/21/2020    ALKPHOS 37 (L) 07/09/2020    ALKPHOS 38 (L) 09/05/2019     BNP: No results found for: BNP  Lipids:   Lab Results   Component Value Date    CHOL 123 07/09/2020    HDL 9 (L) 07/09/2020     INR:   Lab Results   Component Value Date    INR 1.02 10/30/2012     URINE: No results found for: NAUR, PROTUR  No results found for: Arce Serge, PHUR, LABCAST, WBCUA, RBCUA, MUCUS, TRICHOMONAS, YEAST, BACTERIA, CLARITYU, SPECGRAV, LEUKOCYTESUR, UROBILINOGEN, BILIRUBINUR, BLOODU, GLUCOSEU, KETUA, AMORPHOUS   Microalbumen/Creatinine ratio:  No components found for: RUCREAT    Objective:   Vitals: /66 (Site: Right Upper Arm, Position: Sitting, Cuff Size: Small Adult)   Pulse 72   Ht 6' (1.829 m)   Wt 266 lb (120.7 kg)   SpO2 98%   BMI 36.08 kg/m²      Constitutional:  Alert, awake, no apparent distress  Skin:normal with no rash or any significant lesions  HEENT:Pupils are reactive . Throat is clear. Oral mucosa is moist.  Neck:supple with no thyromegaly, JVD, lymphadenopathy or bruit   Cardiovascular: Regular sinus rhythm without murmur, rubs or gallops   Respiratory:  Clear to auscultation with no wheezes or rales  Abdomen: Good bowel sound, soft, non tender and no bruit and colostomy noted  Ext: No LE edema  Musculoskeletal:Intact  Neuro:Alert, awake and oriented with no obvious focal deficit.   Speech is normal.    Electronically signed by Pearl Rueda MD on 1/26/2023 at 1:21 PM   **This report has been created using voice recognition software. It maycontain minor  errors which are inherent in voice recognition technology.**

## 2023-05-09 LAB
ALBUMIN SERPL-MCNC: 3.4 G/DL
ALP BLD-CCNC: 37 U/L
ALT SERPL-CCNC: 15 U/L
ANION GAP SERPL CALCULATED.3IONS-SCNC: 5 MMOL/L
AST SERPL-CCNC: 28 U/L
BILIRUB SERPL-MCNC: 0.6 MG/DL (ref 0.1–1.4)
BUN BLDV-MCNC: 37 MG/DL
CALCIUM SERPL-MCNC: 8.9 MG/DL
CHLORIDE BLD-SCNC: 107 MMOL/L
CO2: 24 MMOL/L
CREAT SERPL-MCNC: 2.14 MG/DL
EGFR: 30
GLUCOSE BLD-MCNC: 60 MG/DL
POTASSIUM SERPL-SCNC: 4.8 MMOL/L
SODIUM BLD-SCNC: 136 MMOL/L
TOTAL PROTEIN: 6.5

## 2023-07-11 LAB
ANION GAP SERPL CALCULATED.3IONS-SCNC: 6 MMOL/L (ref 4–12)
BUN BLDV-MCNC: 42 MG/DL (ref 7–20)
CALCIUM SERPL-MCNC: 9.3 MG/DL (ref 8.8–10.5)
CHLORIDE BLD-SCNC: 104 MEQ/L (ref 101–111)
CO2: 25 MEQ/L (ref 21–32)
CREAT SERPL-MCNC: 2.77 MG/DL (ref 0.6–1.3)
CREATININE CLEARANCE: 23
GLUCOSE: 93 MG/DL (ref 70–110)
PARATHYROID HORMONE INTACT: 35.7 U/ML (ref 12–88)
POTASSIUM SERPL-SCNC: 5.3 MEQ/L (ref 3.6–5)
SODIUM BLD-SCNC: 135 MEQ/L (ref 135–145)
VITAMIN D 25-HYDROXY: 42.6 NG/ML (ref 30–100)

## 2023-07-12 ENCOUNTER — TELEPHONE (OUTPATIENT)
Dept: NEPHROLOGY | Age: 73
End: 2023-07-12

## 2023-07-12 DIAGNOSIS — E87.5 HYPERKALEMIA: Primary | ICD-10-CM

## 2023-07-12 NOTE — TELEPHONE ENCOUNTER
----- Message from Jaelyn Beltre MD sent at 7/12/2023  7:13 AM EDT -----  Serum potassium is slightly high  Low potassium diet  Repeat potassium level in about a-week

## 2023-07-12 NOTE — TELEPHONE ENCOUNTER
I spoke to Rajesh Godinez about this. He will go to [facility] to get the lab repeated.

## 2023-07-17 ENCOUNTER — OFFICE VISIT (OUTPATIENT)
Dept: NEPHROLOGY | Age: 73
End: 2023-07-17
Payer: MEDICARE

## 2023-07-17 VITALS
OXYGEN SATURATION: 96 % | WEIGHT: 249.8 LBS | DIASTOLIC BLOOD PRESSURE: 68 MMHG | BODY MASS INDEX: 33.88 KG/M2 | HEART RATE: 73 BPM | SYSTOLIC BLOOD PRESSURE: 114 MMHG

## 2023-07-17 DIAGNOSIS — I10 PRIMARY HYPERTENSION: ICD-10-CM

## 2023-07-17 DIAGNOSIS — D63.8 ANEMIA OF CHRONIC DISEASE: ICD-10-CM

## 2023-07-17 DIAGNOSIS — N18.4 CKD (CHRONIC KIDNEY DISEASE), STAGE IV (HCC): Primary | ICD-10-CM

## 2023-07-17 DIAGNOSIS — E11.21 DIABETIC NEPHROPATHY ASSOCIATED WITH TYPE 2 DIABETES MELLITUS (HCC): ICD-10-CM

## 2023-07-17 PROCEDURE — 3017F COLORECTAL CA SCREEN DOC REV: CPT | Performed by: INTERNAL MEDICINE

## 2023-07-17 PROCEDURE — 3078F DIAST BP <80 MM HG: CPT | Performed by: INTERNAL MEDICINE

## 2023-07-17 PROCEDURE — 1123F ACP DISCUSS/DSCN MKR DOCD: CPT | Performed by: INTERNAL MEDICINE

## 2023-07-17 PROCEDURE — G8428 CUR MEDS NOT DOCUMENT: HCPCS | Performed by: INTERNAL MEDICINE

## 2023-07-17 PROCEDURE — 3046F HEMOGLOBIN A1C LEVEL >9.0%: CPT | Performed by: INTERNAL MEDICINE

## 2023-07-17 PROCEDURE — G8417 CALC BMI ABV UP PARAM F/U: HCPCS | Performed by: INTERNAL MEDICINE

## 2023-07-17 PROCEDURE — 2022F DILAT RTA XM EVC RTNOPTHY: CPT | Performed by: INTERNAL MEDICINE

## 2023-07-17 PROCEDURE — 99214 OFFICE O/P EST MOD 30 MIN: CPT | Performed by: INTERNAL MEDICINE

## 2023-07-17 PROCEDURE — 1036F TOBACCO NON-USER: CPT | Performed by: INTERNAL MEDICINE

## 2023-07-17 PROCEDURE — 3074F SYST BP LT 130 MM HG: CPT | Performed by: INTERNAL MEDICINE

## 2023-07-17 NOTE — PROGRESS NOTES
2023.  Recent stress test that was abnormal.  It was done by Dr. Nuzhat Garsia. He is therefore scheduled for cardiac catheterization. He has had occasional  low-grade chest pain in the past but nothing recent. No shortness of breath. Appetite is good. No difficulty with urination. ROS:  Pertinent positives stated above in HPI. All other systems were reviewed and were negative. Medications:     Current Outpatient Medications   Medication Sig Dispense Refill    dapagliflozin (FARXIGA) 5 MG tablet Take 1 tablet by mouth every morning      ramipril (ALTACE) 2.5 MG capsule Take 1 capsule by mouth in the morning. 90 capsule 3    famotidine (PEPCID) 10 MG tablet Take 1 tablet by mouth daily      isosorbide mononitrate (IMDUR) 60 MG extended release tablet Take 1.5 tablets by mouth 2 times daily      glimepiride (AMARYL) 2 MG tablet Take 1 tablet by mouth every morning (before breakfast)      Cholecalciferol (VITAMIN D3) 5000 units TABS Take 500 Units by mouth daily      rosuvastatin (CRESTOR) 20 MG tablet Take 1 tablet by mouth daily      pioglitazone (ACTOS) 45 MG tablet Take 1 tablet by mouth daily      metoprolol succinate (TOPROL XL) 25 MG extended release tablet Take 1 tablet by mouth daily      ferrous sulfate 325 (65 FE) MG tablet Take 1 tablet by mouth daily (with breakfast)      [employer] VITAMIN B12 1000 MCG tablet TAKE 1 TABLET BY MOUTH EVERY DAY 90 tablet 3    Magnesium 400 MG TABS Take 500 mg by mouth 2 times daily       Multiple Vitamins-Minerals (PX MENS MULTIVITAMINS PO) Take  by mouth. otc-with iron       fenofibrate (TRICOR) 145 MG tablet Take 1 tablet by mouth daily      aspirin 325 MG tablet Take 1 tablet by mouth daily Stop 1 week preop      Omega-3 Fatty Acids (FISH OIL) 1000 MG CAPS Take 1 capsule by mouth 2 times daily       No current facility-administered medications for this visit.        Lab Results:    CBC:   Lab Results   Component Value Date    WBC 4.1 (L) 07/11/2023    HGB 11.7 (L) 07/11/2023

## 2023-07-19 LAB
ANION GAP SERPL CALCULATED.3IONS-SCNC: 7 MMOL/L (ref 4–12)
BUN BLDV-MCNC: 31 MG/DL (ref 7–20)
CALCIUM SERPL-MCNC: 9.2 MG/DL (ref 8.8–10.5)
CHLORIDE BLD-SCNC: 104 MEQ/L (ref 101–111)
CO2: 24 MEQ/L (ref 21–32)
CREAT SERPL-MCNC: 2.44 MG/DL (ref 0.6–1.3)
CREATININE CLEARANCE: 26
GLUCOSE: 143 MG/DL (ref 70–110)
POTASSIUM SERPL-SCNC: 4.6 MEQ/L (ref 3.6–5)
SODIUM BLD-SCNC: 135 MEQ/L (ref 135–145)

## 2023-08-01 RX ORDER — RAMIPRIL 2.5 MG/1
2.5 CAPSULE ORAL DAILY
Qty: 90 CAPSULE | Refills: 0 | OUTPATIENT
Start: 2023-08-01

## 2023-08-08 LAB
ANION GAP SERPL CALCULATED.3IONS-SCNC: 9 MMOL/L (ref 4–12)
BUN BLDV-MCNC: 26 MG/DL (ref 7–20)
CALCIUM SERPL-MCNC: 8.9 MG/DL (ref 8.8–10.5)
CHLORIDE BLD-SCNC: 107 MEQ/L (ref 101–111)
CO2: 24 MEQ/L (ref 21–32)
CREAT SERPL-MCNC: 1.96 MG/DL (ref 0.6–1.3)
CREATININE CLEARANCE: 34
GLUCOSE: 140 MG/DL (ref 70–110)
POTASSIUM SERPL-SCNC: 4.8 MEQ/L (ref 3.6–5)
SODIUM BLD-SCNC: 140 MEQ/L (ref 135–145)

## 2023-08-14 ENCOUNTER — OFFICE VISIT (OUTPATIENT)
Dept: NEPHROLOGY | Age: 73
End: 2023-08-14
Payer: MEDICARE

## 2023-08-14 VITALS
HEIGHT: 72 IN | HEART RATE: 71 BPM | WEIGHT: 252 LBS | BODY MASS INDEX: 34.13 KG/M2 | SYSTOLIC BLOOD PRESSURE: 117 MMHG | DIASTOLIC BLOOD PRESSURE: 63 MMHG | OXYGEN SATURATION: 98 %

## 2023-08-14 DIAGNOSIS — N18.32 STAGE 3B CHRONIC KIDNEY DISEASE (HCC): Primary | ICD-10-CM

## 2023-08-14 DIAGNOSIS — E11.21 DIABETIC NEPHROPATHY ASSOCIATED WITH TYPE 2 DIABETES MELLITUS (HCC): ICD-10-CM

## 2023-08-14 DIAGNOSIS — D63.8 ANEMIA OF CHRONIC DISEASE: ICD-10-CM

## 2023-08-14 DIAGNOSIS — I10 PRIMARY HYPERTENSION: ICD-10-CM

## 2023-08-14 PROCEDURE — G8427 DOCREV CUR MEDS BY ELIG CLIN: HCPCS | Performed by: INTERNAL MEDICINE

## 2023-08-14 PROCEDURE — 3046F HEMOGLOBIN A1C LEVEL >9.0%: CPT | Performed by: INTERNAL MEDICINE

## 2023-08-14 PROCEDURE — 3078F DIAST BP <80 MM HG: CPT | Performed by: INTERNAL MEDICINE

## 2023-08-14 PROCEDURE — 3074F SYST BP LT 130 MM HG: CPT | Performed by: INTERNAL MEDICINE

## 2023-08-14 PROCEDURE — 1123F ACP DISCUSS/DSCN MKR DOCD: CPT | Performed by: INTERNAL MEDICINE

## 2023-08-14 PROCEDURE — 2022F DILAT RTA XM EVC RTNOPTHY: CPT | Performed by: INTERNAL MEDICINE

## 2023-08-14 PROCEDURE — 3017F COLORECTAL CA SCREEN DOC REV: CPT | Performed by: INTERNAL MEDICINE

## 2023-08-14 PROCEDURE — 1036F TOBACCO NON-USER: CPT | Performed by: INTERNAL MEDICINE

## 2023-08-14 PROCEDURE — 99214 OFFICE O/P EST MOD 30 MIN: CPT | Performed by: INTERNAL MEDICINE

## 2023-08-14 PROCEDURE — G8417 CALC BMI ABV UP PARAM F/U: HCPCS | Performed by: INTERNAL MEDICINE

## 2023-08-14 RX ORDER — RANOLAZINE 500 MG/1
500 TABLET, EXTENDED RELEASE ORAL 2 TIMES DAILY
COMMUNITY

## 2023-08-14 NOTE — PROGRESS NOTES
Renal Progress Note    Assessment and Plan:      Diagnosis Orders   1. Stage 3b chronic kidney disease ([address])        2. Primary hypertension        3. Anemia of chronic disease        4. Diabetic nephropathy associated with type 2 diabetes mellitus ([address])                  PLAN:  I discussed my thoughts with the patient and spouse. They understood well. I addressed their questions. Reviewed lab results with them  Serum creatinine is improved to 1.96 mg/dL from 2.44 mg/dL. Medications reviewed  No changes  Okay to proceed with cardiac cath  He will need pre and post procedure hydration protocol  Return visit in 3 months with labs          Patient Active Problem List   Diagnosis    Diverticula of colon    CKD (chronic kidney disease) stage 3, GFR 30-59 ml/min ([facility])    Vitamin D deficiency    DM type 2 (diabetes mellitus, type 2) ([address])    CAD (coronary artery disease)    Iron deficiency anemia    Hypertriglyceridemia    Type 2 diabetes mellitus with stage 3 chronic kidney disease, without long-term current use of insulin ([facility])    Anemia, chronic disease    Recurrent incisional hernia    Essential hypertension    SBO (small bowel obstruction) ([address])           Subjective:   Chief complaint:  Chief Complaint   Patient presents with    Chronic Kidney Disease     iv      HPI:This is a follow up visit for Mr. Antonio Reyes here today for return appointment. I see him for chronic kidney disease. He was last seen about 4 weeks ago. At that time serum creatinine had increased to 2.77 mg/L from 2.14 mg/dL. We discontinued his enalapril. We encouraged him to increase his fluid intake. He was scheduled for cardiac catheterization which we placed on hold. He is doing well with no complaint. Appetite is good. No nausea vomiting or diarrhea. He does have shortness of breath with moderate exertion relieved by rest.    ROS:  Pertinent positives stated above in HPI.  All other systems were reviewed and were

## 2023-08-17 ENCOUNTER — TELEPHONE (OUTPATIENT)
Dept: NEPHROLOGY | Age: 73
End: 2023-08-17

## 2023-08-28 DIAGNOSIS — N18.32 STAGE 3B CHRONIC KIDNEY DISEASE (HCC): Primary | ICD-10-CM

## 2023-08-30 LAB
ANION GAP SERPL CALCULATED.3IONS-SCNC: 6 MMOL/L (ref 4–12)
BUN BLDV-MCNC: 26 MG/DL (ref 7–20)
CALCIUM SERPL-MCNC: 8.8 MG/DL (ref 8.8–10.5)
CHLORIDE BLD-SCNC: 105 MEQ/L (ref 101–111)
CO2: 26 MEQ/L (ref 21–32)
CREAT SERPL-MCNC: 1.87 MG/DL (ref 0.6–1.3)
CREATININE CLEARANCE: 35
GLUCOSE: 158 MG/DL (ref 70–110)
POTASSIUM SERPL-SCNC: 4.3 MEQ/L (ref 3.6–5)
SODIUM BLD-SCNC: 137 MEQ/L (ref 135–145)

## 2023-09-01 PROBLEM — Z95.0 CARDIAC PACEMAKER: Status: ACTIVE | Noted: 2023-09-01

## 2023-09-01 PROBLEM — I51.89 DIASTOLIC DYSFUNCTION: Status: ACTIVE | Noted: 2023-09-01

## 2023-09-01 PROBLEM — Z95.5 STENTED CORONARY ARTERY: Status: ACTIVE | Noted: 2023-09-01

## 2023-09-01 PROBLEM — I20.0 PROGRESSIVE ANGINA (HCC): Status: ACTIVE | Noted: 2023-09-01

## 2023-09-01 PROBLEM — R94.39 ABNORMAL STRESS TEST: Status: ACTIVE | Noted: 2023-09-01

## 2023-09-01 PROBLEM — Z95.1 S/P CABG X 3: Status: ACTIVE | Noted: 2023-09-01

## 2023-09-05 ENCOUNTER — OFFICE VISIT (OUTPATIENT)
Dept: NEPHROLOGY | Age: 73
End: 2023-09-05
Payer: MEDICARE

## 2023-09-05 VITALS
DIASTOLIC BLOOD PRESSURE: 62 MMHG | BODY MASS INDEX: 34.54 KG/M2 | HEART RATE: 76 BPM | WEIGHT: 255 LBS | HEIGHT: 72 IN | OXYGEN SATURATION: 97 % | SYSTOLIC BLOOD PRESSURE: 112 MMHG

## 2023-09-05 DIAGNOSIS — N18.32 STAGE 3B CHRONIC KIDNEY DISEASE (HCC): Primary | ICD-10-CM

## 2023-09-05 DIAGNOSIS — E11.21 DIABETIC NEPHROPATHY ASSOCIATED WITH TYPE 2 DIABETES MELLITUS (HCC): ICD-10-CM

## 2023-09-05 DIAGNOSIS — I10 PRIMARY HYPERTENSION: ICD-10-CM

## 2023-09-05 PROCEDURE — 2022F DILAT RTA XM EVC RTNOPTHY: CPT | Performed by: INTERNAL MEDICINE

## 2023-09-05 PROCEDURE — 1036F TOBACCO NON-USER: CPT | Performed by: INTERNAL MEDICINE

## 2023-09-05 PROCEDURE — 1123F ACP DISCUSS/DSCN MKR DOCD: CPT | Performed by: INTERNAL MEDICINE

## 2023-09-05 PROCEDURE — 3078F DIAST BP <80 MM HG: CPT | Performed by: INTERNAL MEDICINE

## 2023-09-05 PROCEDURE — 3074F SYST BP LT 130 MM HG: CPT | Performed by: INTERNAL MEDICINE

## 2023-09-05 PROCEDURE — 3046F HEMOGLOBIN A1C LEVEL >9.0%: CPT | Performed by: INTERNAL MEDICINE

## 2023-09-05 PROCEDURE — G8417 CALC BMI ABV UP PARAM F/U: HCPCS | Performed by: INTERNAL MEDICINE

## 2023-09-05 PROCEDURE — 3017F COLORECTAL CA SCREEN DOC REV: CPT | Performed by: INTERNAL MEDICINE

## 2023-09-05 PROCEDURE — G8427 DOCREV CUR MEDS BY ELIG CLIN: HCPCS | Performed by: INTERNAL MEDICINE

## 2023-09-05 PROCEDURE — 99214 OFFICE O/P EST MOD 30 MIN: CPT | Performed by: INTERNAL MEDICINE

## 2023-09-05 RX ORDER — CLOPIDOGREL BISULFATE 75 MG/1
75 TABLET ORAL DAILY
COMMUNITY

## 2023-09-05 NOTE — PROGRESS NOTES
Drugs to Avoid with Chronic Kidney Disease    Non-steroidal anti-inflammatory drugs (NSAIDS)    Potential complication and side effects of NSAIDS include:  Kidney injury and worsening kidney function   Risk of stomach ulcer and intestinal bleeding  Fluid retention and edema  Increased Blood Pressure    Non-Steroidal Anti-Inflammatory Drugs    Celecoxib (Celebrex)  Choline and magnesium salicylates (CMT, Trisosal, Trilisate)  Choline salicylate (Arthropan)  Diclofenac (Voltaren, Arthrotec, Cataflam, Cambia, Voltaren-XR, Zipsor)  Diflunisal (Dolobid)  Etodolac (Lodine XL, Lodine)  Famotidine + Ibuprofen (Duexis)  Fenoprofen (Nalfon, Nalfon 200)  Furbiprofen (Asaid)  Ibuprofen (Advil, Motrin, Midol, Nuprin, Genpril, Dolgesic,Profen,, Vicoprofen,Combunox, Actiprofen, Addaprin, Caldolor, Haltran, Q-Profen,Ibren, Menadol, Rufen,Saleto-200, [place],Ultraprin, Uni-Pro,Wal-Profen)  Indomethacin (Indocin, Indomethegan, Indo-Mitchell)   Ketoprofen (Orudis  KT, Oruvail, Actron)  Ketorolac (Toradol, Sprix)  Magnesium Sulfate (Arthritab, Nidia Select, Jensen's pills, Robert, Mobidin, Mobogesic)  Meclofenamate Sodium (Ponstel)  Meloxicam (Mobic)  Naproxen (Aleve, Anaprox, Naprosyn, EC-Naprosyn, Naprelan, All Day Pain Relief, Aflaxen, Anaprox-DS, Midol Extended Relief, Naprelan,Prevacid NapraPac, Naprapac, Vimovo)  Nabumetone (Relafen)  Oxaprozin (Daypro)  Piroxicam (Feldene)  Rofecoxib (Vioxx)  Salsalate (Amigesic, Anaflex 750, Disalcid, Marthritic, Mono-gesic, Salflex, Salsitab)  Sodium salicylate (various generics)  Sulindac (Clinoril)  Tolmetin (Tolectin)  Valdecoxib (Bextra)  Mefenamic Acid (Ponstel)  Famotidine and Ibuprofen (Duexis) but not famotidine by itself  Meclofenamate (Meclomen)    Antibiotics  Bactrim  Gentamycin  Tobramycin  Vancomycin  Tetracycline  Macrobid    Other                  IV contrast dye
were negative. Medications:     Current Outpatient Medications   Medication Sig Dispense Refill    clopidogrel (PLAVIX) 75 MG tablet Take 1 tablet by mouth daily      ranolazine (RANEXA) 500 MG extended release tablet Take 250 mg by mouth 2 times daily      dapagliflozin (FARXIGA) 5 MG tablet Take 1 tablet by mouth every morning      famotidine (PEPCID) 10 MG tablet Take 1 tablet by mouth daily      isosorbide mononitrate (IMDUR) 60 MG extended release tablet Take 1.5 tablets by mouth 2 times daily      glimepiride (AMARYL) 2 MG tablet Take 0.5 tablets by mouth every morning (before breakfast)      Cholecalciferol (VITAMIN D3) 5000 units TABS Take 500 Units by mouth daily      rosuvastatin (CRESTOR) 40 MG tablet Take 1 tablet by mouth daily      pioglitazone (ACTOS) 45 MG tablet Take 0.5 tablets by mouth daily      metoprolol succinate (TOPROL XL) 25 MG extended release tablet Take 1 tablet by mouth daily      ferrous sulfate 325 (65 FE) MG tablet Take 1 tablet by mouth daily (with breakfast)      [employer] VITAMIN B12 1000 MCG tablet TAKE 1 TABLET BY MOUTH EVERY DAY 90 tablet 3    Magnesium 400 MG TABS Take 400 mg by mouth daily      Multiple Vitamins-Minerals (PX MENS MULTIVITAMINS PO) Take  by mouth. otc-with iron       fenofibrate (TRICOR) 145 MG tablet Take 1 tablet by mouth daily      aspirin 81 MG EC tablet Take 1 tablet by mouth daily Stop 1 week preop      Omega-3 Fatty Acids (FISH OIL) 1000 MG CAPS Take 1 capsule by mouth 2 times daily       No current facility-administered medications for this visit.        Lab Results:    CBC:   Lab Results   Component Value Date    WBC 4.6 08/22/2023    HGB 11.8 (L) 08/22/2023    HCT 35.3 (L) 08/22/2023    MCV 93.8 08/22/2023     08/22/2023     BMP:    Lab Results   Component Value Date     08/30/2023     08/24/2023     08/22/2023    K 4.3 08/30/2023    K 4.2 08/24/2023    K 4.5 08/22/2023     08/30/2023     08/24/2023     08/22/2023

## 2023-11-13 LAB
ANION GAP SERPL CALCULATED.3IONS-SCNC: 6 MMOL/L (ref 4–12)
BUN BLDV-MCNC: 31 MG/DL (ref 7–20)
CALCIUM SERPL-MCNC: 9.1 MG/DL (ref 8.8–10.5)
CHLORIDE BLD-SCNC: 109 MEQ/L (ref 101–111)
CO2: 26 MEQ/L (ref 21–32)
CREAT SERPL-MCNC: 1.98 MG/DL (ref 0.6–1.3)
CREATININE CLEARANCE: 33
CREATININE, RANDOM URINE: 103.7 MG/DL
GLUCOSE: 81 MG/DL (ref 70–110)
PARATHYROID HORMONE INTACT: 33.7 U/ML (ref 12–88)
POTASSIUM SERPL-SCNC: 4.9 MEQ/L (ref 3.6–5)
PROTEIN, URINE, RANDOM: 6.8 MG/DL
PROTEIN/CREAT RATIO: 0.07 G/1.73M2
SODIUM BLD-SCNC: 141 MEQ/L (ref 135–145)
VITAMIN D 25-HYDROXY: 38.4 NG/ML (ref 30–100)

## 2023-11-20 ENCOUNTER — OFFICE VISIT (OUTPATIENT)
Dept: NEPHROLOGY | Age: 73
End: 2023-11-20
Payer: MEDICARE

## 2023-11-20 VITALS
HEIGHT: 72 IN | SYSTOLIC BLOOD PRESSURE: 118 MMHG | BODY MASS INDEX: 34.54 KG/M2 | OXYGEN SATURATION: 100 % | DIASTOLIC BLOOD PRESSURE: 67 MMHG | HEART RATE: 70 BPM | WEIGHT: 255 LBS

## 2023-11-20 DIAGNOSIS — N25.81 HYPERPARATHYROIDISM, SECONDARY RENAL (HCC): ICD-10-CM

## 2023-11-20 DIAGNOSIS — E55.9 VITAMIN D DEFICIENCY: ICD-10-CM

## 2023-11-20 DIAGNOSIS — E11.21 DIABETIC NEPHROPATHY ASSOCIATED WITH TYPE 2 DIABETES MELLITUS (HCC): ICD-10-CM

## 2023-11-20 DIAGNOSIS — N18.32 STAGE 3B CHRONIC KIDNEY DISEASE (HCC): Primary | ICD-10-CM

## 2023-11-20 DIAGNOSIS — R80.9 PROTEINURIA, UNSPECIFIED TYPE: ICD-10-CM

## 2023-11-20 DIAGNOSIS — I10 PRIMARY HYPERTENSION: ICD-10-CM

## 2023-11-20 PROCEDURE — 2022F DILAT RTA XM EVC RTNOPTHY: CPT | Performed by: INTERNAL MEDICINE

## 2023-11-20 PROCEDURE — 1123F ACP DISCUSS/DSCN MKR DOCD: CPT | Performed by: INTERNAL MEDICINE

## 2023-11-20 PROCEDURE — 1036F TOBACCO NON-USER: CPT | Performed by: INTERNAL MEDICINE

## 2023-11-20 PROCEDURE — 3074F SYST BP LT 130 MM HG: CPT | Performed by: INTERNAL MEDICINE

## 2023-11-20 PROCEDURE — 99213 OFFICE O/P EST LOW 20 MIN: CPT | Performed by: INTERNAL MEDICINE

## 2023-11-20 PROCEDURE — G8484 FLU IMMUNIZE NO ADMIN: HCPCS | Performed by: INTERNAL MEDICINE

## 2023-11-20 PROCEDURE — 3078F DIAST BP <80 MM HG: CPT | Performed by: INTERNAL MEDICINE

## 2023-11-20 PROCEDURE — 3017F COLORECTAL CA SCREEN DOC REV: CPT | Performed by: INTERNAL MEDICINE

## 2023-11-20 PROCEDURE — 3046F HEMOGLOBIN A1C LEVEL >9.0%: CPT | Performed by: INTERNAL MEDICINE

## 2023-11-20 PROCEDURE — G8417 CALC BMI ABV UP PARAM F/U: HCPCS | Performed by: INTERNAL MEDICINE

## 2023-11-20 PROCEDURE — G8427 DOCREV CUR MEDS BY ELIG CLIN: HCPCS | Performed by: INTERNAL MEDICINE

## 2023-11-20 NOTE — PROGRESS NOTES
last time I saw him. His pioglitazone was decreased to one half however so. Had stenting done in September 2023 and doing cardiac rehab now. ROS:  Pertinent positives stated above in HPI. All other systems were reviewed and were negative. Medications:     Current Outpatient Medications   Medication Sig Dispense Refill    clopidogrel (PLAVIX) 75 MG tablet Take 1 tablet by mouth daily      ranolazine (RANEXA) 500 MG extended release tablet Take 250 mg by mouth 2 times daily      dapagliflozin (FARXIGA) 5 MG tablet Take 1 tablet by mouth every morning      famotidine (PEPCID) 10 MG tablet Take 1 tablet by mouth daily      isosorbide mononitrate (IMDUR) 60 MG extended release tablet Take 1.5 tablets by mouth 2 times daily      glimepiride (AMARYL) 2 MG tablet Take 0.5 tablets by mouth every morning (before breakfast)      Cholecalciferol (VITAMIN D3) 5000 units TABS Take 500 Units by mouth daily      rosuvastatin (CRESTOR) 40 MG tablet Take 1 tablet by mouth daily      pioglitazone (ACTOS) 45 MG tablet Take 0.5 tablets by mouth daily      metoprolol succinate (TOPROL XL) 25 MG extended release tablet Take 1 tablet by mouth daily      ferrous sulfate 325 (65 FE) MG tablet Take 1 tablet by mouth daily (with breakfast)      [employer] VITAMIN B12 1000 MCG tablet TAKE 1 TABLET BY MOUTH EVERY DAY 90 tablet 3    Magnesium 400 MG TABS Take 400 mg by mouth daily      Multiple Vitamins-Minerals (PX MENS MULTIVITAMINS PO) Take  by mouth. otc-with iron       fenofibrate (TRICOR) 145 MG tablet Take 1 tablet by mouth daily      aspirin 81 MG EC tablet Take 1 tablet by mouth daily Stop 1 week preop      Omega-3 Fatty Acids (FISH OIL) 1000 MG CAPS Take 1 capsule by mouth 2 times daily       No current facility-administered medications for this visit.        Lab Results:    CBC:   Lab Results   Component Value Date    WBC 4.6 08/22/2023    HGB 11.8 (L) 08/22/2023    HCT 35.3 (L) 08/22/2023    MCV 93.8 08/22/2023     08/22/2023

## (undated) DEVICE — GLOVE ORANGE PI 7 1/2   MSG9075

## (undated) DEVICE — BLADE CLIPPER GEN PURP NS

## (undated) DEVICE — Device

## (undated) DEVICE — GLOVE SURG SZ 65 THK91MIL LTX FREE SYN POLYISOPRENE

## (undated) DEVICE — POSITIONER HD W8XH4XL8.5IN RASPBERRY FOAM SLT

## (undated) DEVICE — 2-PIECE OSTOMY SKIN BARRIER, CERAPLUS: Brand: NEW IMAGE

## (undated) DEVICE — SPONGE GZ W4XL4IN COT 12 PLY TYP VII WVN C FLD DSGN

## (undated) DEVICE — SOLUTION IV IRRIG POUR BRL 0.9% SODIUM CHL 2F7124

## (undated) DEVICE — 2-PIECE DRAINABLE OSTOMY POUCH: Brand: NEW IMAGE

## (undated) DEVICE — PACK PROCEDURE SURG PLAS SC MIN SRHP LF

## (undated) DEVICE — BLADE LARYNSCP SZ 3 ENH DIR INTUB GLIDESCOPE MCGRATH MAC

## (undated) DEVICE — 2000CC GUARDIAN II: Brand: GUARDIAN

## (undated) DEVICE — COVER ARMBRD W13XL28.5IN IMPERV BLU FOR OP RM

## (undated) DEVICE — BANDAGE,GAUZE,4.5"X4.1YD,STERILE,LF: Brand: MEDLINE

## (undated) DEVICE — PEN: MARKING STD 100/CS: Brand: MEDICAL ACTION INDUSTRIES

## (undated) DEVICE — DRAIN CHN 19FR L0.25IN DIA6.3MM SIL RND HUBLESS FULL FLUT

## (undated) DEVICE — DRESSING,GAUZE,XEROFORM,CURAD,5"X9",ST: Brand: CURAD

## (undated) DEVICE — MEDI-VAC YANKAUER SUCTION HANDLE W/BULBOUS TIP: Brand: CARDINAL HEALTH

## (undated) DEVICE — 4-PORT MANIFOLD: Brand: NEPTUNE 2

## (undated) DEVICE — SPONGE LAP W18XL18IN WHT COT 4 PLY FLD STRUNG RADPQ DISP ST

## (undated) DEVICE — GOWN,SIRUS,NON REINFRCD,LARGE,SET IN SL: Brand: MEDLINE

## (undated) DEVICE — GLOVE ORANGE PI 7   MSG9070

## (undated) DEVICE — MEDI-VAC NON-CONDUCTIVE SUCTION TUBING 6MM X 6.1M (20 FT.) L: Brand: CARDINAL HEALTH

## (undated) DEVICE — PAD,ABDOMINAL,5"X9",ST,LF,25/BX: Brand: MEDLINE INDUSTRIES, INC.

## (undated) DEVICE — 3M™ WARMING BLANKET, UPPER BODY, 10 PER CASE, 42268: Brand: BAIR HUGGER™

## (undated) DEVICE — STERILE COTTON BALLS LARGE 5/P: Brand: MEDLINE